# Patient Record
Sex: FEMALE | Race: WHITE | Employment: FULL TIME | URBAN - METROPOLITAN AREA
[De-identification: names, ages, dates, MRNs, and addresses within clinical notes are randomized per-mention and may not be internally consistent; named-entity substitution may affect disease eponyms.]

---

## 2018-10-10 PROBLEM — E66.01 SEVERE OBESITY (HCC): Status: ACTIVE | Noted: 2018-10-10

## 2020-12-29 PROBLEM — E78.5 DYSLIPIDEMIA: Status: ACTIVE | Noted: 2020-12-29

## 2021-04-20 NOTE — H&P (VIEW-ONLY)
Shine Mccormick MD  
Bariatric & Advanced Laparoscopic Surgery & Endoscopy [facility], [address] [place] Phone [phone]   Fax [phone] Date of visit: 2021 Primary/Requesting provider: Meldon Hammans, MD  
   
 
Name: Clarence Gibson MRN: [id number] : 1990 Age: 26 y.o. Sex: female PCP: Meldon Hammans, MD  
 
CC:   
Chief Complaint Patient presents with  New Patient NP Umbilical Hernia HPI: 
 
 Clarence Gibson is a 26 y.o. female who presents for evaluation of abdominal wall bulge that she noticed about 7 years ago but it has slowly growing in size. She reports the bulge has been getting larger over the last year and she does not have a belly button anymore. She denies any drainage or redness of the skin. She denies any large amount of weight. She reports the area is painful. Pain is 5-6/10. Pain is worse with activity and heavy lifting. Pain is better with rest. No nausea or vomiting. No fever or chills. No constipation or diarrhea. Previous abdominal surgeries -  No immunosuppressants. No blood thinners. No smoking. PMH: 
 
History reviewed. No pertinent past medical history. PSH: 
 
Past Surgical History:  
Procedure Laterality Date  HX  SECTION  2013 MEDS: 
 
No current outpatient medications on file. No current facility-administered medications for this visit. ALLERGIES:   
 
Allergies Allergen Reactions  Codeine Itching  Norco [Hydrocodone-Acetaminophen] Shortness of Breath Also makes itchy, clammy, causes a rash, and dizziness.  Tramadol Nausea Only SH: Social History Tobacco Use  Smoking status: Never Smoker  Smokeless tobacco: Never Used Substance Use Topics  Alcohol use: Yes Alcohol/week: 1.0 standard drinks Types: 1 Cans of beer per week Comment: Less than 1 per week  Drug use: No  
 
 
FH: 
 
Family History Problem Relation Age of Onset  Diabetes Mother  Hypertension Mother  Thyroid Disease Mother  Celiac Disease Mother  Hypertension Father  Stroke Father  Hypertension Sister Review of systems: 
Review of Systems Constitutional: Negative for chills, fever and weight loss. HENT: Negative for congestion, hearing loss and sore throat. Eyes: Negative for blurred vision, double vision, discharge and redness. Reading glasses Respiratory: Negative for cough, hemoptysis, shortness of breath, wheezing and stridor. Cardiovascular: Negative for chest pain, palpitations and PND. Gastrointestinal: Positive for abdominal pain. Negative for blood in stool, constipation, diarrhea, heartburn, melena, nausea and vomiting. Genitourinary: Negative for dysuria, frequency, hematuria and urgency. Musculoskeletal: Negative for back pain, falls, joint pain and neck pain. Skin: Negative for itching and rash. Neurological: Negative for dizziness, sensory change, speech change, focal weakness, loss of consciousness and headaches. Endo/Heme/Allergies: Negative for environmental allergies. Does not bruise/bleed easily. Psychiatric/Behavioral: Negative for depression and memory loss. The patient is not nervous/anxious and does not have insomnia. Physical Exam:  
 
Visit Vitals BP (!) 154/105 Pulse 68 Wt 212 lb (96.2 kg) BMI 38.16 kg/m² General:  Well-developed, well-nourished, no distress. Psych:  Cooperative, good insight and judgement. Neuro:  Alert, oriented to person, place and time. HEENT:  Normocephalic, atraumatic. Sclera clear. Lungs:  Unlabored breathing. Symmetrical chest expansion. Chest wall:  No tenderness or deformity. Heart:  Regular rate and rhythm. No JVD. Abdomen:  Soft, non-tender, non-distended. Incarcerated ventral hernia above umbilicus, tender when trying to reduce.  
Extremities:  Extremities normal, atraumatic, no cyanosis or edema. Skin:  Skin color, texture, turgor normal. No rashes. Labs: All recent labs were reviewed. Normal WBC. Normal Hgb. ICD-10-CM ICD-9-CM 1. Incarcerated ventral hernia  K43.6 552.20 2. Periumbilical abdominal pain  R10.33 789.05 Assessment/Plan:  Nestor Pablo is a 26 y.o. female who has signs and symptoms consistent with incarcerated ventral hernia repair 1. I reviewed in detail with the patient what a hernia is, how it forms and the anatomy of the abdomen. I explained the symptoms of a hernia and complications including pain, incarceration, strangulation and obstruction and the symptoms to monitor for at home. 2. I discussed with the patient in detail an open vs. laparoscopic hernia repair with mesh and reviewed the risks and complications of both procedures to include bleeding, infection, mesh infection, recurrence of hernia, injury to abdominal organs and surrounding structures, deep vein thrombosis, pulmonary embolism, need for reoperation, myocardial infarction and death. She would like to proceed with a robotic-assisted laparoscopic incarcerated ventral hernia repair with mesh. She demonstrated understanding of the procedure, risks and complications and I answered all of her questions. Alternative is to watch/wait until symptoms worsen prior to intervention. Nestor Pablo  indicates that she understands the risks and elects to proceed. She has had adequate time to have her questions answered. 3. We specifically discussed the need to take 1-2 weeks off from work followed by light duty for 6 weeks. No heavy lifting, not greater than 20 lbs. for 6 weeks total to enable optimal healing and prevent recurrence. 4. Follow up 2 weeks after surgery.  
 
 
Time: I spent 45 minutes preparing to see patient (including chart review and preparation), obtaining and/or reviewing additional medical history, performing a physical exam and evaluation, documenting clinical information in the electronic health record, independently interpreting results, communicating results to patient, family or caregiver, and/or coordinating care. Signed: Sarath Smaaniego MD 
Bariatric & Minimally Invasive Surgery 4/20/2021 10:31 AM

## 2021-04-28 PROBLEM — E66.09 CLASS 2 OBESITY DUE TO EXCESS CALORIES WITHOUT SERIOUS COMORBIDITY WITH BODY MASS INDEX (BMI) OF 38.0 TO 38.9 IN ADULT: Status: ACTIVE | Noted: 2018-10-10

## 2021-04-28 PROBLEM — K43.9 VENTRAL HERNIA WITHOUT OBSTRUCTION OR GANGRENE: Status: ACTIVE | Noted: 2021-04-28

## 2021-04-29 ENCOUNTER — HOSPITAL ENCOUNTER (OUTPATIENT)
Dept: SURGERY | Age: 31
Discharge: HOME | End: 2021-04-29

## 2021-04-30 VITALS — WEIGHT: 213 LBS | HEIGHT: 63 IN | BODY MASS INDEX: 37.74 KG/M2

## 2021-04-30 NOTE — PERIOP NOTES
Patient verified name and . Order for consent found in EHR and matches case posting; patient verifies procedure. Type 2 surgery, PAT phone assessment complete. Orders received. Labs per surgeon: No lab work ordered. Labs per anesthesia protocol: Hgb; order signed and held in EHR. Patient instructed to come to outpatient lab, [address], any weekday, prior to surgery, between 0800 and 1530 to have lab work completed. Patient verbalized understanding. Patient COVID test completed yesterday; results pending. Patient answered medical/surgical history questions at their best of ability. All prior to admission medications documented in [facility]. Patient instructed to take the following medications the day of surgery according to anesthesia guidelines with a small sip of water: None. Hold all vitamins, supplements, herbals 7 days prior to surgery and NSAIDS/ASA 5 days prior to surgery. Patient instructed on the following:    > Arrive at [facility], time of arrival to be called the day before by 1700  > NPO after midnight including gum, mints, and ice chips  > Responsible adult must drive patient to the hospital, stay during surgery, and patient will need supervision 24 hours after anesthesia  > Use anti-bacterial soap in shower the night before surgery and on the morning of surgery  > All piercings must be removed prior to arrival.    > Leave all valuables (money and jewelry) at home but bring insurance card and ID on DOS.   > Do not wear make-up, nail polish, lotions, cologne, perfumes, powders, or oil on skin.

## 2021-05-04 ENCOUNTER — HOSPITAL ENCOUNTER (OUTPATIENT)
Dept: LAB | Age: 31
Discharge: HOME | End: 2021-05-04
Payer: COMMERCIAL

## 2021-05-04 LAB — HGB BLD-MCNC: 15 G/DL (ref 11.7–15.4)

## 2021-05-04 PROCEDURE — 36415 COLL VENOUS BLD VENIPUNCTURE: CPT

## 2021-05-04 PROCEDURE — 85018 HEMOGLOBIN: CPT

## 2021-05-04 NOTE — PERIOP NOTES
Lab results within anesthesia guidelines.   Recent Results (from the past 12 hour(s))   HEMOGLOBIN    Collection Time: 05/04/21 11:15 AM   Result Value Ref Range    HGB 15.0 11.7 - 15.4 g/dL

## 2021-05-05 ENCOUNTER — ANESTHESIA EVENT (OUTPATIENT)
Dept: SURGERY | Age: 31
End: 2021-05-05
Payer: COMMERCIAL

## 2021-05-06 ENCOUNTER — ANESTHESIA (OUTPATIENT)
Dept: SURGERY | Age: 31
End: 2021-05-06
Payer: COMMERCIAL

## 2021-05-06 ENCOUNTER — HOSPITAL ENCOUNTER (OUTPATIENT)
Age: 31
Setting detail: OUTPATIENT SURGERY
Discharge: HOME | End: 2021-05-06
Attending: SURGERY | Admitting: SURGERY
Payer: COMMERCIAL

## 2021-05-06 VITALS
OXYGEN SATURATION: 97 % | TEMPERATURE: 97.9 F | SYSTOLIC BLOOD PRESSURE: 121 MMHG | RESPIRATION RATE: 24 BRPM | HEART RATE: 78 BPM | BODY MASS INDEX: 37.56 KG/M2 | WEIGHT: 212 LBS | HEIGHT: 63 IN | DIASTOLIC BLOOD PRESSURE: 67 MMHG

## 2021-05-06 DIAGNOSIS — K43.6 INCARCERATED VENTRAL HERNIA: Primary | ICD-10-CM

## 2021-05-06 LAB — HCG UR QL: NEGATIVE

## 2021-05-06 PROCEDURE — 74011000250 HC RX REV CODE- 250

## 2021-05-06 PROCEDURE — 74011250636 HC RX REV CODE- 250/636: Performed by: NURSE ANESTHETIST, CERTIFIED REGISTERED

## 2021-05-06 PROCEDURE — 77030033188 HC TBNG FLTRD BIIFUR DISP CNMD -C: Performed by: SURGERY

## 2021-05-06 PROCEDURE — 77030019908 HC STETH ESOPH SIMS -A: Performed by: ANESTHESIOLOGY

## 2021-05-06 PROCEDURE — 76210000026 HC REC RM PH II 1 TO 1.5 HR: Performed by: SURGERY

## 2021-05-06 PROCEDURE — 49653 PR LAP, VENTRAL HERNIA REPAIR,INCARCERATED: CPT | Performed by: SURGERY

## 2021-05-06 PROCEDURE — 74011250636 HC RX REV CODE- 250/636: Performed by: ANESTHESIOLOGY

## 2021-05-06 PROCEDURE — 74011250636 HC RX REV CODE- 250/636: Performed by: SURGERY

## 2021-05-06 PROCEDURE — 74011250636 HC RX REV CODE- 250/636

## 2021-05-06 PROCEDURE — 77030008606 HC TRCR ENDOSC KII AMR -B: Performed by: SURGERY

## 2021-05-06 PROCEDURE — 77030035029 HC NDL INSUF VERES DISP COVD -B: Performed by: SURGERY

## 2021-05-06 PROCEDURE — 77030037088 HC TUBE ENDOTRACH ORAL NSL COVD-A: Performed by: ANESTHESIOLOGY

## 2021-05-06 PROCEDURE — 77030040361 HC SLV COMPR DVT MDII -B: Performed by: SURGERY

## 2021-05-06 PROCEDURE — 81025 URINE PREGNANCY TEST: CPT

## 2021-05-06 PROCEDURE — 2709999900 HC NON-CHARGEABLE SUPPLY: Performed by: SURGERY

## 2021-05-06 PROCEDURE — 76060000034 HC ANESTHESIA 1.5 TO 2 HR: Performed by: SURGERY

## 2021-05-06 PROCEDURE — 77030003578 HC NDL INSUF VERES AMR -B: Performed by: SURGERY

## 2021-05-06 PROCEDURE — 77030040922 HC BLNKT HYPOTHRM STRY -A: Performed by: ANESTHESIOLOGY

## 2021-05-06 PROCEDURE — C1781 MESH (IMPLANTABLE): HCPCS | Performed by: SURGERY

## 2021-05-06 PROCEDURE — 77030035277 HC OBTRTR BLDELSS DISP INTU -B: Performed by: SURGERY

## 2021-05-06 PROCEDURE — S2900 ROBOTIC SURGICAL SYSTEM: HCPCS | Performed by: SURGERY

## 2021-05-06 PROCEDURE — 77030008518 HC TBNG INSUF ENDO STRY -B: Performed by: SURGERY

## 2021-05-06 PROCEDURE — 77030022704 HC SUT VLOC COVD -B: Performed by: SURGERY

## 2021-05-06 PROCEDURE — 77030016151 HC PROTCTR LNS DFOG COVD -B: Performed by: SURGERY

## 2021-05-06 PROCEDURE — 74011250637 HC RX REV CODE- 250/637: Performed by: ANESTHESIOLOGY

## 2021-05-06 PROCEDURE — 76010000875 HC OR TIME 1.5 TO 2HR INTENSV - TIER 2: Performed by: SURGERY

## 2021-05-06 PROCEDURE — 77030021678 HC GLIDESCP STAT DISP VERT -B: Performed by: ANESTHESIOLOGY

## 2021-05-06 PROCEDURE — 77030008771 HC TU NG SALEM SUMP -A: Performed by: ANESTHESIOLOGY

## 2021-05-06 PROCEDURE — 77030002933 HC SUT MCRYL J&J -A: Performed by: SURGERY

## 2021-05-06 PROCEDURE — 77030031139 HC SUT VCRL2 J&J -A: Performed by: SURGERY

## 2021-05-06 PROCEDURE — 74011000250 HC RX REV CODE- 250: Performed by: SURGERY

## 2021-05-06 PROCEDURE — 76210000000 HC OR PH I REC 2 TO 2.5 HR: Performed by: SURGERY

## 2021-05-06 DEVICE — MESH SURG W6XL6IN POLY POLYLACTIC ACID MFIL KNIT RECTANG: Type: IMPLANTABLE DEVICE | Site: ABDOMEN | Status: FUNCTIONAL

## 2021-05-06 RX ORDER — SODIUM CHLORIDE 0.9 % (FLUSH) 0.9 %
5-40 SYRINGE (ML) INJECTION AS NEEDED
Status: DISCONTINUED | OUTPATIENT
Start: 2021-05-06 | End: 2021-05-06 | Stop reason: HOSPADM

## 2021-05-06 RX ORDER — PROPOFOL 10 MG/ML
INJECTION, EMULSION INTRAVENOUS AS NEEDED
Status: DISCONTINUED | OUTPATIENT
Start: 2021-05-06 | End: 2021-05-06 | Stop reason: HOSPADM

## 2021-05-06 RX ORDER — CYCLOBENZAPRINE HCL 10 MG
10 TABLET ORAL
Qty: 45 TAB | Refills: 0 | Status: SHIPPED | OUTPATIENT
Start: 2021-05-06 | End: 2021-05-21

## 2021-05-06 RX ORDER — MIDAZOLAM HYDROCHLORIDE 1 MG/ML
2 INJECTION, SOLUTION INTRAMUSCULAR; INTRAVENOUS
Status: DISCONTINUED | OUTPATIENT
Start: 2021-05-06 | End: 2021-05-06 | Stop reason: HOSPADM

## 2021-05-06 RX ORDER — TRAMADOL HYDROCHLORIDE 50 MG/1
50 TABLET ORAL
Qty: 28 TAB | Refills: 0 | Status: SHIPPED | OUTPATIENT
Start: 2021-05-06 | End: 2021-05-06 | Stop reason: SDUPTHER

## 2021-05-06 RX ORDER — LIDOCAINE HYDROCHLORIDE 20 MG/ML
INJECTION, SOLUTION EPIDURAL; INFILTRATION; INTRACAUDAL; PERINEURAL AS NEEDED
Status: DISCONTINUED | OUTPATIENT
Start: 2021-05-06 | End: 2021-05-06 | Stop reason: HOSPADM

## 2021-05-06 RX ORDER — SODIUM CHLORIDE, SODIUM LACTATE, POTASSIUM CHLORIDE, CALCIUM CHLORIDE 600; 310; 30; 20 MG/100ML; MG/100ML; MG/100ML; MG/100ML
100 INJECTION, SOLUTION INTRAVENOUS CONTINUOUS
Status: DISCONTINUED | OUTPATIENT
Start: 2021-05-06 | End: 2021-05-06 | Stop reason: HOSPADM

## 2021-05-06 RX ORDER — SODIUM CHLORIDE, SODIUM LACTATE, POTASSIUM CHLORIDE, CALCIUM CHLORIDE 600; 310; 30; 20 MG/100ML; MG/100ML; MG/100ML; MG/100ML
100 INJECTION, SOLUTION INTRAVENOUS CONTINUOUS
Status: DISCONTINUED | OUTPATIENT
Start: 2021-05-06 | End: 2021-05-07 | Stop reason: HOSPADM

## 2021-05-06 RX ORDER — CEFAZOLIN SODIUM/WATER 2 G/20 ML
SYRINGE (ML) INTRAVENOUS AS NEEDED
Status: DISCONTINUED | OUTPATIENT
Start: 2021-05-06 | End: 2021-05-06 | Stop reason: HOSPADM

## 2021-05-06 RX ORDER — ROCURONIUM BROMIDE 10 MG/ML
INJECTION, SOLUTION INTRAVENOUS AS NEEDED
Status: DISCONTINUED | OUTPATIENT
Start: 2021-05-06 | End: 2021-05-06 | Stop reason: HOSPADM

## 2021-05-06 RX ORDER — TRAMADOL HYDROCHLORIDE 50 MG/1
50 TABLET ORAL
Qty: 28 TAB | Refills: 0 | Status: SHIPPED | OUTPATIENT
Start: 2021-05-06 | End: 2021-05-13

## 2021-05-06 RX ORDER — NALOXONE HYDROCHLORIDE 0.4 MG/ML
0.04 INJECTION, SOLUTION INTRAMUSCULAR; INTRAVENOUS; SUBCUTANEOUS
Status: DISCONTINUED | OUTPATIENT
Start: 2021-05-06 | End: 2021-05-07 | Stop reason: HOSPADM

## 2021-05-06 RX ORDER — GLYCOPYRROLATE 0.2 MG/ML
INJECTION INTRAMUSCULAR; INTRAVENOUS AS NEEDED
Status: DISCONTINUED | OUTPATIENT
Start: 2021-05-06 | End: 2021-05-06 | Stop reason: HOSPADM

## 2021-05-06 RX ORDER — FENTANYL CITRATE 50 UG/ML
100 INJECTION, SOLUTION INTRAMUSCULAR; INTRAVENOUS ONCE
Status: DISCONTINUED | OUTPATIENT
Start: 2021-05-06 | End: 2021-05-06 | Stop reason: HOSPADM

## 2021-05-06 RX ORDER — HYDROMORPHONE HYDROCHLORIDE 2 MG/ML
0.5 INJECTION, SOLUTION INTRAMUSCULAR; INTRAVENOUS; SUBCUTANEOUS
Status: DISCONTINUED | OUTPATIENT
Start: 2021-05-06 | End: 2021-05-07 | Stop reason: HOSPADM

## 2021-05-06 RX ORDER — ONDANSETRON 2 MG/ML
INJECTION INTRAMUSCULAR; INTRAVENOUS AS NEEDED
Status: DISCONTINUED | OUTPATIENT
Start: 2021-05-06 | End: 2021-05-06 | Stop reason: HOSPADM

## 2021-05-06 RX ORDER — LIDOCAINE HYDROCHLORIDE 10 MG/ML
0.1 INJECTION INFILTRATION; PERINEURAL AS NEEDED
Status: DISCONTINUED | OUTPATIENT
Start: 2021-05-06 | End: 2021-05-06 | Stop reason: HOSPADM

## 2021-05-06 RX ORDER — DEXAMETHASONE SODIUM PHOSPHATE 4 MG/ML
INJECTION, SOLUTION INTRA-ARTICULAR; INTRALESIONAL; INTRAMUSCULAR; INTRAVENOUS; SOFT TISSUE AS NEEDED
Status: DISCONTINUED | OUTPATIENT
Start: 2021-05-06 | End: 2021-05-06 | Stop reason: HOSPADM

## 2021-05-06 RX ORDER — BUPIVACAINE HYDROCHLORIDE 2.5 MG/ML
INJECTION, SOLUTION EPIDURAL; INFILTRATION; INTRACAUDAL AS NEEDED
Status: DISCONTINUED | OUTPATIENT
Start: 2021-05-06 | End: 2021-05-06 | Stop reason: HOSPADM

## 2021-05-06 RX ORDER — SODIUM CHLORIDE 0.9 % (FLUSH) 0.9 %
5-40 SYRINGE (ML) INJECTION EVERY 8 HOURS
Status: DISCONTINUED | OUTPATIENT
Start: 2021-05-06 | End: 2021-05-06 | Stop reason: HOSPADM

## 2021-05-06 RX ORDER — FENTANYL CITRATE 50 UG/ML
INJECTION, SOLUTION INTRAMUSCULAR; INTRAVENOUS AS NEEDED
Status: DISCONTINUED | OUTPATIENT
Start: 2021-05-06 | End: 2021-05-06 | Stop reason: HOSPADM

## 2021-05-06 RX ORDER — ACETAMINOPHEN 500 MG
1000 TABLET ORAL ONCE
Status: COMPLETED | OUTPATIENT
Start: 2021-05-06 | End: 2021-05-06

## 2021-05-06 RX ORDER — CEFAZOLIN SODIUM/WATER 2 G/20 ML
2 SYRINGE (ML) INTRAVENOUS ONCE
Status: COMPLETED | OUTPATIENT
Start: 2021-05-06 | End: 2021-05-06

## 2021-05-06 RX ORDER — MIDAZOLAM HYDROCHLORIDE 1 MG/ML
2 INJECTION, SOLUTION INTRAMUSCULAR; INTRAVENOUS ONCE
Status: DISCONTINUED | OUTPATIENT
Start: 2021-05-06 | End: 2021-05-06 | Stop reason: HOSPADM

## 2021-05-06 RX ORDER — NEOSTIGMINE METHYLSULFATE 1 MG/ML
INJECTION, SOLUTION INTRAVENOUS AS NEEDED
Status: DISCONTINUED | OUTPATIENT
Start: 2021-05-06 | End: 2021-05-06 | Stop reason: HOSPADM

## 2021-05-06 RX ORDER — KETOROLAC TROMETHAMINE 30 MG/ML
INJECTION, SOLUTION INTRAMUSCULAR; INTRAVENOUS AS NEEDED
Status: DISCONTINUED | OUTPATIENT
Start: 2021-05-06 | End: 2021-05-06 | Stop reason: HOSPADM

## 2021-05-06 RX ADMIN — SODIUM CHLORIDE, SODIUM LACTATE, POTASSIUM CHLORIDE, AND CALCIUM CHLORIDE: 600; 310; 30; 20 INJECTION, SOLUTION INTRAVENOUS at 12:12

## 2021-05-06 RX ADMIN — SODIUM CHLORIDE, SODIUM LACTATE, POTASSIUM CHLORIDE, AND CALCIUM CHLORIDE 100 ML/HR: 600; 310; 30; 20 INJECTION, SOLUTION INTRAVENOUS at 09:38

## 2021-05-06 RX ADMIN — CEFAZOLIN 2 G: 1 INJECTION, POWDER, FOR SOLUTION INTRAVENOUS at 11:33

## 2021-05-06 RX ADMIN — ONDANSETRON 4 MG: 2 INJECTION INTRAMUSCULAR; INTRAVENOUS at 12:58

## 2021-05-06 RX ADMIN — FENTANYL CITRATE 100 MCG: 50 INJECTION INTRAMUSCULAR; INTRAVENOUS at 11:38

## 2021-05-06 RX ADMIN — GLYCOPYRROLATE 0.4 MG: 0.2 INJECTION, SOLUTION INTRAMUSCULAR; INTRAVENOUS at 13:05

## 2021-05-06 RX ADMIN — HYDROMORPHONE HYDROCHLORIDE 0.5 MG: 2 INJECTION INTRAMUSCULAR; INTRAVENOUS; SUBCUTANEOUS at 14:36

## 2021-05-06 RX ADMIN — Medication 3 MG: at 13:05

## 2021-05-06 RX ADMIN — CEFAZOLIN SODIUM 2 G: 10 INJECTION, POWDER, FOR SOLUTION INTRAVENOUS at 09:38

## 2021-05-06 RX ADMIN — MIDAZOLAM 2 MG: 1 INJECTION INTRAMUSCULAR; INTRAVENOUS at 11:25

## 2021-05-06 RX ADMIN — ACETAMINOPHEN 1000 MG: 500 TABLET, FILM COATED ORAL at 09:46

## 2021-05-06 RX ADMIN — LIDOCAINE HYDROCHLORIDE 60 MG: 20 INJECTION, SOLUTION EPIDURAL; INFILTRATION; INTRACAUDAL; PERINEURAL at 11:38

## 2021-05-06 RX ADMIN — FENTANYL CITRATE 100 MCG: 50 INJECTION INTRAMUSCULAR; INTRAVENOUS at 12:02

## 2021-05-06 RX ADMIN — ROCURONIUM BROMIDE 50 MG: 10 INJECTION, SOLUTION INTRAVENOUS at 11:38

## 2021-05-06 RX ADMIN — PROPOFOL 200 MG: 10 INJECTION, EMULSION INTRAVENOUS at 11:38

## 2021-05-06 RX ADMIN — DEXAMETHASONE SODIUM PHOSPHATE 5 MG: 4 INJECTION, SOLUTION INTRAMUSCULAR; INTRAVENOUS at 11:58

## 2021-05-06 RX ADMIN — KETOROLAC TROMETHAMINE 30 MG: 30 INJECTION, SOLUTION INTRAMUSCULAR; INTRAVENOUS at 13:01

## 2021-05-06 NOTE — INTERVAL H&P NOTE
Update History & Physical 
 
The Patient's History and Physical of April 20, Robotic assisted lap incarcerated VHR with mesh was reviewed with the patient and I examined the patient. There was no change. The surgical site was confirmed by the patient and me. Plan:  The risk, benefits, expected outcome, and alternative to the recommended procedure have been discussed with the patient. Patient understands and wants to proceed with the procedure.  
 
Electronically signed by Greg Reardon MD on 5/6/2021 at 11:29 AM

## 2021-05-06 NOTE — OP NOTES
Operative Report    Name: Jarrell Randall      MRN: [id number]       : 1990       Age: 26 y.o. Sex: female    Date of Surgery: 2021     Preoperative Diagnosis: Ventral hernia without obstruction or gangrene [K43.9]     Postoperative Diagnosis: Ventral hernia without obstruction or gangrene [K43.9]     Name of procedure: Procedure(s): HERNIA VENTRAL REPAIR ROBOTIC ASSISTED WITH MESH     Surgeon: Trang Armendariz MD     Anesthesia: General     Complications: None    Estimated Blood Loss: Minimal           Specimens: * No specimens in log *    Implants:  Implant Name Type Inv. Item Serial No.  Lot No. LRB No. Used Action   MESH SURG [id number] POLY POLYLACTIC ACID MFIL KNIT RECTANG - [id number]  [address] [facility] POLYLACTIC ACID MFIL KNIT 230 [facility] SURGICAL_WD [id number] N/A 1 Implanted       Findings: 3 x 3 cm abdominal wall hernia defect found and closed with a running 0 Stratiffix suture. A 10 x 10 cm Progrip mesh was placed in the preperitoneal space and fixated. The peritoneum was then closed and the mesh was completely covered. Statement of Medical Necessity: Jarrell Randall is a 26 y.o. female who presented to the clinic complaining of periumbilical bulge and abdominal pain which has been worsening with time. On exam, he was found to have a incarcerated ventral hernia. We discussed risks, benefits of laparoscopic and open surgery as well as alternatives of surgery. Patient understood and agreed to proceed with a robotic-assisted laparoscopic ventral hernia repair with mesh. We discussed risks of hernia repair including but not limited to pain, infection, bleeding, scar, conversion from laparoscopic to open, injury to organs, enterotomy, cautery injury, hernia recurrence, need for additional procedures. Procedure Details   After informed consent was taken, patient was taken to the operating room and placed in supine position.  Anesthesia was induced and patient was intubated. Patient received preoperative antibiotics. The abdomen was prepped and draped in standard sterile fashion. A timeout was performed with all team members present. A 1 cm incision was made in the left upper quadrant. A Veress needle was inserted. Saline drop test was normal. The abdomen was insufflated with carbon dioxide to a pressure of 15 mmHg and the patient tolerated insufflation well. The abdomen was bluntly accessed with a blunt 8 mm robotic trocar. A laparoscope was inserted and a general survey was performed. There was no evidence of intraabdominal injury from trocar placement. Two additional 8 mm robotic cannulas were inserted in the left side of the abdomen. The patient was placed in a right tilted position. The robot was docked. The instruments were carefully inserted under direct visualization. A general survey was performed and a incarcerated ventral hernia was observed. Using a combination of blunt and sharp dissection, a prepreritoneal plane was created at the location of the hernia to reduce the hernia contents and accommodate the mesh. A 3 x 3 cm abdominal hernia defect was identified and measured. The contents of the hernia were carefully reduced intraperitoneally. The hernia defect was closed with 3-0 PDS in a running fashion. A Progrip mesh was used and cut to 10 x 10 cm. We then implanted the mesh into the preperitoneal space created. We had wide overlap of the hernia defect and a very good repair. No bleeding was observed. The insufflation pressure was reduced to 8 mmHg. The preperitoneal plane was closed with 2-0 V-loc suture in a running fashion. The robot was undocked. The abdomen was desufflated. The ports were removed. The skin of all cannula insertion sites was closed with 4-0 Monocryl subcuticular sutures. Mastisol and Steri strips were applied to the skin incisions.  A pressure dressing was placed at the umbilicus to avoid seroma from dead space. All counts were reported as correct. The patient was awakened from anesthesia, transferred to a stretcher, and transported to the PACU in good condition.         Signed by: Thong Mcdonnell MD  [facility] - Bariatric & Minimally Invasive Surgery  5/6/2021 1:24 PM

## 2021-05-06 NOTE — ANESTHESIA PREPROCEDURE EVALUATION
Relevant Problems   No relevant active problems       Anesthetic History   No history of anesthetic complications            Review of Systems / Medical History  Pertinent labs reviewed    Pulmonary  Within defined limits                 Neuro/Psych   Within defined limits           Cardiovascular  Within defined limits                Exercise tolerance: >4 METS     GI/Hepatic/Renal  Within defined limits              Endo/Other        Obesity     Other Findings              Physical Exam    Airway  Mallampati: III  TM Distance: < 4 cm  Neck ROM: normal range of motion, short neck   Mouth opening: Normal     Cardiovascular  Regular rate and rhythm,  S1 and S2 normal,  no murmur, click, rub, or gallop             Dental  No notable dental hx       Pulmonary  Breath sounds clear to auscultation               Abdominal  GI exam deferred       Other Findings            Anesthetic Plan    ASA: 2  Anesthesia type: general          Induction: Intravenous  Anesthetic plan and risks discussed with: Patient      Glidescope in room.

## 2021-05-06 NOTE — DISCHARGE INSTRUCTIONS
No bathtub or pool for 2 weeks. Ok to shower. No weight lifting greater than 20 lbs for 4 weeks. Remove the clear dressing and gauze dressing from your umbilicus in 5 days. Leave the Steri strips in place. They will fall off on their own in 7-10 days. Take tylenol or ibuprofen for as needed for mild pain every 4-6 hours. Tramadol prescribed for mod to severe pain. This is a narcotic and can cause drowsiness, nausea and vomiting and constipation. Take Colace 100mg BID (over the counter) if constipated.

## 2021-05-06 NOTE — ANESTHESIA POSTPROCEDURE EVALUATION
Procedure(s): HERNIA VENTRAL REPAIR ROBOTIC ASSISTED WITH MESH.     general    Anesthesia Post Evaluation      Multimodal analgesia: multimodal analgesia not used between 6 hours prior to anesthesia start to PACU discharge  Patient location during evaluation: PACU  Patient participation: complete - patient participated  Level of consciousness: awake and alert  Pain management: adequate  Airway patency: patent  Anesthetic complications: no  Cardiovascular status: hemodynamically stable  Respiratory status: acceptable  Hydration status: acceptable        INITIAL Post-op Vital signs:   Vitals Value Taken Time   /67 05/06/21 1436   Temp 36.6 °C (97.9 °F) 05/06/21 1320   Pulse 78 05/06/21 1436   Resp 24 05/06/21 1436   SpO2 97 % 05/06/21 1436

## 2021-08-16 PROBLEM — G44.219 EPISODIC TENSION-TYPE HEADACHE, NOT INTRACTABLE: Status: ACTIVE | Noted: 2021-08-16

## 2021-09-20 PROBLEM — K43.9 VENTRAL HERNIA WITHOUT OBSTRUCTION OR GANGRENE: Status: RESOLVED | Noted: 2021-04-28 | Resolved: 2021-09-20

## 2021-09-24 ENCOUNTER — HOSPITAL ENCOUNTER (EMERGENCY)
Age: 31
Discharge: LEFT BEFORE DISCHARGE | End: 2021-09-24
Payer: COMMERCIAL

## 2021-09-24 PROCEDURE — 75810000275 HC EMERGENCY DEPT VISIT NO LEVEL OF CARE

## 2022-01-11 ENCOUNTER — HOSPITAL ENCOUNTER (OUTPATIENT)
Dept: GENERAL RADIOLOGY | Age: 32
Discharge: HOME | End: 2022-01-11
Payer: COMMERCIAL

## 2022-01-11 DIAGNOSIS — M79.672 BILATERAL FOOT PAIN: ICD-10-CM

## 2022-01-11 DIAGNOSIS — M79.671 BILATERAL FOOT PAIN: ICD-10-CM

## 2022-01-11 PROCEDURE — 73630 X-RAY EXAM OF FOOT: CPT

## 2022-03-18 PROBLEM — G44.219 EPISODIC TENSION-TYPE HEADACHE, NOT INTRACTABLE: Status: ACTIVE | Noted: 2021-08-16

## 2022-03-19 PROBLEM — E66.09 CLASS 2 OBESITY DUE TO EXCESS CALORIES WITHOUT SERIOUS COMORBIDITY WITH BODY MASS INDEX (BMI) OF 38.0 TO 38.9 IN ADULT: Status: ACTIVE | Noted: 2018-10-10

## 2022-03-19 PROBLEM — E78.5 DYSLIPIDEMIA: Status: ACTIVE | Noted: 2020-12-29

## 2022-03-19 PROBLEM — E66.812 CLASS 2 OBESITY DUE TO EXCESS CALORIES WITHOUT SERIOUS COMORBIDITY WITH BODY MASS INDEX (BMI) OF 38.0 TO 38.9 IN ADULT: Status: ACTIVE | Noted: 2018-10-10

## 2022-07-11 ENCOUNTER — OFFICE VISIT (OUTPATIENT)
Dept: FAMILY MEDICINE CLINIC | Facility: CLINIC | Age: 32
End: 2022-07-11
Payer: COMMERCIAL

## 2022-07-11 VITALS
BODY MASS INDEX: 38.27 KG/M2 | HEIGHT: 63 IN | TEMPERATURE: 98.7 F | OXYGEN SATURATION: 97 % | SYSTOLIC BLOOD PRESSURE: 112 MMHG | WEIGHT: 216 LBS | DIASTOLIC BLOOD PRESSURE: 78 MMHG | HEART RATE: 81 BPM

## 2022-07-11 DIAGNOSIS — Z11.59 NEED FOR HEPATITIS C SCREENING TEST: ICD-10-CM

## 2022-07-11 DIAGNOSIS — G44.219 EPISODIC TENSION-TYPE HEADACHE, NOT INTRACTABLE: ICD-10-CM

## 2022-07-11 DIAGNOSIS — K21.9 GASTROESOPHAGEAL REFLUX DISEASE WITHOUT ESOPHAGITIS: ICD-10-CM

## 2022-07-11 DIAGNOSIS — E66.09 CLASS 2 OBESITY DUE TO EXCESS CALORIES WITHOUT SERIOUS COMORBIDITY WITH BODY MASS INDEX (BMI) OF 38.0 TO 38.9 IN ADULT: ICD-10-CM

## 2022-07-11 DIAGNOSIS — E78.5 DYSLIPIDEMIA: ICD-10-CM

## 2022-07-11 DIAGNOSIS — Z00.00 PREVENTATIVE HEALTH CARE: Primary | ICD-10-CM

## 2022-07-11 DIAGNOSIS — Z11.4 SCREENING FOR HIV WITHOUT PRESENCE OF RISK FACTORS: ICD-10-CM

## 2022-07-11 PROCEDURE — 99395 PREV VISIT EST AGE 18-39: CPT | Performed by: NURSE PRACTITIONER

## 2022-07-11 RX ORDER — OMEPRAZOLE 20 MG/1
20 CAPSULE, DELAYED RELEASE ORAL DAILY
Qty: 90 CAPSULE | Refills: 3 | Status: SHIPPED | OUTPATIENT
Start: 2022-07-11

## 2022-07-11 RX ORDER — BIOTIN 10 MG
TABLET ORAL DAILY
COMMUNITY

## 2022-07-11 ASSESSMENT — PATIENT HEALTH QUESTIONNAIRE - PHQ9
SUM OF ALL RESPONSES TO PHQ QUESTIONS 1-9: 0
2. FEELING DOWN, DEPRESSED OR HOPELESS: 0
SUM OF ALL RESPONSES TO PHQ QUESTIONS 1-9: 0
SUM OF ALL RESPONSES TO PHQ9 QUESTIONS 1 & 2: 0
1. LITTLE INTEREST OR PLEASURE IN DOING THINGS: 0
SUM OF ALL RESPONSES TO PHQ QUESTIONS 1-9: 0
SUM OF ALL RESPONSES TO PHQ QUESTIONS 1-9: 0

## 2022-07-11 ASSESSMENT — ENCOUNTER SYMPTOMS
VOMITING: 0
WHEEZING: 0
DIARRHEA: 0
ABDOMINAL PAIN: 0
NAUSEA: 0
CONSTIPATION: 0
SORE THROAT: 0
EYE PAIN: 0
COUGH: 0
BACK PAIN: 0
SHORTNESS OF BREATH: 0
SINUS PAIN: 0

## 2022-07-11 NOTE — PATIENT INSTRUCTIONS
Patient Education        Plantar Fasciitis: Exercises  Introduction  Here are some examples of exercises for you to try. The exercises may be suggested for a condition or for rehabilitation. Start each exercise slowly. Ease off the exercises if you start to have pain. You will be told when to start these exercises and which ones will work bestfor you. How to do the exercises  Towel stretch    1. Sit with your legs extended and knees straight. 2. Place a towel around your foot just under the toes. 3. Hold each end of the towel in each hand, with your hands above your knees. 4. Pull back with the towel so that your foot stretches toward you. 5. Hold the position for at least 15 to 30 seconds. 6. Repeat 2 to 4 times a session, up to 5 sessions a day. Calf stretch    This exercise stretches the muscles at the back of the lower leg (the calf) andthe Achilles tendon. Do this exercise 3 or 4 times a day, 5 days a week. 1. Stand facing a wall with your hands on the wall at about eye level. Put the leg you want to stretch about a step behind your other leg. 2. Keeping your back heel on the floor, bend your front knee until you feel a stretch in the back leg. 3. Hold the stretch for 15 to 30 seconds. Repeat 2 to 4 times. Plantar fascia and calf stretch    Stretching the plantar fascia and calf muscles can increase flexibility and decrease heel pain. You can do this exercise several times each day and beforeand after activity. 1. Stand on a step as shown above. Be sure to hold on to the banister. 2. Slowly let your heels down over the edge of the step as you relax your calf muscles. You should feel a gentle stretch across the bottom of your foot and up the back of your leg to your knee. 3. Hold the stretch about 15 to 30 seconds, and then tighten your calf muscle a little to bring your heel back up to the level of the step. Repeat 2 to 4 times.   Towel curls    Make this exercise more challenging by placing a weighted object, such as asoup can, on the other end of the towel. 1. While sitting, place your foot on a towel on the floor and scrunch the towel toward you with your toes. 2. Then, also using your toes, push the towel away from you. [place] pickups    1. Put marbles on the floor next to a cup.  2. Using your toes, try to lift the marbles up from the floor and put them in the cup. Follow-up care is a key part of your treatment and safety. Be sure to make and go to all appointments, and call your doctor if you are having problems. It's also a good idea to know your test results and keep alist of the medicines you take. Where can you learn more? Go to https://Aloompa.[employer] org and sign in to your [employer] account. Enter O711 in the [employer] box to learn more about \"Plantar Fasciitis: Exercises. \"     If you do not have an account, please click on the \"Sign Up Now\" link. Current as of: March 9, 2022               Content Version: 13.3  © 2006-2022 [employer]. Care instructions adapted under license by [facility] ([facility]). If you have questions about a medical condition or this instruction, always ask your healthcare professional. [address] any warranty or liability for your use of this information. Patient Education        Plantar Fasciitis: Care Instructions  Overview     Plantar fasciitis is pain and inflammation of the plantar fascia, the tissue at the bottom of your foot that connects the heel bone to the toes. The plantar fascia also supports the arch. If you strain the plantar fascia, it can developsmall tears and cause heel pain when you stand or walk. Plantar fasciitis can be caused by running or other sports. It also may occur in people who are overweight or who have high arches or flat feet. You may get plantar fasciitis if you walk or stand for long periods, or have a tightAchilles tendon or calf muscles.   You can improve your foot pain with rest and other care at home. It might takea few weeks to a few months for your foot to heal completely. Follow-up care is a key part of your treatment and safety. Be sure to make and go to all appointments, and call your doctor if you are having problems. It's also a good idea to know your test results and keep alist of the medicines you take. How can you care for yourself at home?  Rest your feet often. Reduce your activity to a level that lets you avoid pain. If possible, do not run or walk on hard surfaces.  Take pain medicines exactly as directed. ? If the doctor gave you a prescription medicine for pain, take it as prescribed. ? If you are not taking a prescription pain medicine, take an over-the-counter anti-inflammatory medicine for pain and swelling, such as ibuprofen (Advil, Motrin) or naproxen (Aleve). Read and follow all instructions on the label.  Use ice massage to help with pain and swelling. You can use an ice cube or an ice cup several times a day. To make an ice cup, fill a paper cup with water and freeze it. Cut off the top of the cup until a half-inch of ice shows. Hold onto the remaining paper to use the cup. Rub the ice in small circles over the area for 5 to 7 minutes.  Contrast baths, which alternate hot and cold water, can also help reduce swelling. But because heat alone may make pain and swelling worse, end a contrast bath with a soak in cold water.  Wear a night splint if your doctor suggests it. A night splint holds your foot with the toes pointed up and the foot and ankle at a 90-degree angle. This position gives the bottom of your foot a constant, gentle stretch.  Do simple exercises such as calf stretches and towel stretches 2 to 3 times each day, especially when you first get up in the morning. These can help the plantar fascia become more flexible. They also make the muscles that support your arch stronger.  Hold these stretches for 15 to 30 seconds per stretch. Repeat 2 to 4 times. ? Stand about 1 foot from a wall. Place the palms of both hands against the wall at chest level. Lean forward against the wall, keeping one leg with the knee straight and heel on the ground while bending the knee of the other leg.  ? Sit down on the floor or a mat with your feet stretched in front of you. Roll up a towel lengthwise, and loop it over the ball of your foot. Holding the towel at both ends, gently pull the towel toward you to stretch your foot.  Wear shoes with good arch support. Athletic shoes or shoes with a well-cushioned sole are good choices.  Replace athletic shoes regularly.  Try heel cups or shoe inserts (orthotics) to help cushion your heel. You can buy these at many shoe stores.  Put on your shoes as soon as you get out of bed. Going barefoot or wearing slippers may make your pain worse.  Reach and stay at a good weight for your height. This puts less strain on your feet. When should you call for help? Call your doctor now or seek immediate medical care if:     You have heel pain with fever, redness, or warmth in your heel.      You cannot put weight on the sore foot. Watch closely for changes in your health, and be sure to contact your doctor if:     You have numbness or tingling in your heel.      Your heel pain lasts more than 2 weeks. Where can you learn more? Go to https://JobpartnersisaíasThotz.[employer]. org and sign in to your [employer] account. Enter P400 in the [facility] box to learn more about \"Plantar Fasciitis: Care Instructions. \"     If you do not have an account, please click on the \"Sign Up Now\" link. Current as of: March 9, 2022               Content Version: 13.3  © 3330-2584 [employer] Inc[employer]. Care instructions adapted under license by [facility] ([facility]).  If you have questions about a medical condition or this instruction, always ask your healthcare professional. Ch[employer]jia disclaims any warranty or liability for your use of this information.

## 2022-07-11 NOTE — PROGRESS NOTES
2022    Yisel Neff (:  1990) is a 33 y.o. female, here for a preventive medicine evaluation. Patient Active Problem List   Diagnosis    Episodic tension-type headache, not intractable    Dyslipidemia    Class 2 obesity due to excess calories without serious comorbidity with body mass index (BMI) of 38.0 to 38.9 in adult    Gastroesophageal reflux disease without esophagitis       Review of Systems   Constitutional: Negative for appetite change, fatigue, fever and unexpected weight change. HENT: Negative for congestion, ear pain, postnasal drip, sinus pain and sore throat. Eyes: Negative for pain. Respiratory: Negative for cough, shortness of breath and wheezing. Cardiovascular: Negative for palpitations and leg swelling. Gastrointestinal: Negative for abdominal pain, constipation, diarrhea, nausea and vomiting. Genitourinary: Negative for dysuria, frequency and urgency. Musculoskeletal: Negative for back pain and joint swelling. Skin: Negative for rash and wound. Neurological: Negative for dizziness, weakness and headaches. Hematological: Does not bruise/bleed easily. Prior to Visit Medications    Medication Sig Taking? Authorizing Provider   Multiple Vitamins-Minerals (MULTIVITAMIN ADULT) CHEW Take by mouth daily Yes Historical Provider, MD   ECHINACEA PO Take by mouth 760 mg    Take once daily for 3 weeks. Yes Historical Provider, MD   omeprazole (PRILOSEC) 20 MG delayed release capsule Take 1 capsule by mouth daily Yes LAURIE Munoz CNP   propranolol (INDERAL XL) 80 MG extended release capsule Take 1 capsule by mouth daily Yes LAURIE Munoz CNP   magnesium oxide (MAG-OX) 400 MG tablet Take 400 mg by mouth daily Yes Ar Automatic Reconciliation        Allergies   Allergen Reactions    Hydrocodone-Acetaminophen Shortness Of Breath     Also makes itchy, clammy, causes a rash, and dizziness.     Codeine Itching    Tramadol Nausea Year: Not on file    Number of Places Lived in the Last Year: Not on file    Unstable Housing in the Last Year: Not on file        Family History   Problem Relation Age of Onset    Diabetes Mother     Hypertension Mother     Thyroid Disease Mother     Hypertension Sister     Hypertension Father     Stroke Father     Celiac Disease Mother        ADVANCE DIRECTIVE: N, <no information>    Vitals:    07/11/22 1310   BP: 112/78   Site: Left Upper Arm   Position: Sitting   Cuff Size: Large Adult   Pulse: 81   Temp: 98.7 °F (37.1 °C)   TempSrc: Oral   SpO2: 97%   Weight: 216 lb (98 kg)   Height: 5' 3\" (1.6 m)     Estimated body mass index is 38.26 kg/m² as calculated from the following:    Height as of this encounter: 5' 3\" (1.6 m). Weight as of this encounter: 216 lb (98 kg). Physical Exam  Vitals reviewed. Constitutional:       Appearance: Normal appearance. HENT:      Head: Normocephalic and atraumatic. Eyes:      Extraocular Movements: Extraocular movements intact. Pupils: Pupils are equal, round, and reactive to light. Cardiovascular:      Rate and Rhythm: Normal rate and regular rhythm. Heart sounds: Normal heart sounds. Pulmonary:      Effort: Pulmonary effort is normal.      Breath sounds: Normal breath sounds. Abdominal:      General: Abdomen is flat. Skin:     General: Skin is warm and dry. Neurological:      General: No focal deficit present. Mental Status: She is alert and oriented to person, place, and time. No flowsheet data found. Lab Results   Component Value Date/Time    CHOL 203 12/28/2020 10:59 AM    TRIG 116 12/28/2020 10:59 AM    HDL 40 12/28/2020 10:59 AM    LDLCALC 142 12/28/2020 10:59 AM    GLUCOSE 96 12/28/2020 10:59 AM       The ASCVD Risk score (Radha Collado et al., 2013) failed to calculate for the following reasons:     The 2013 ASCVD risk score is only valid for ages 36 to 78    Immunization History   Administered Date(s) Administered   [facility] Td (Adult), 2 Lf Tetanus Toxoid, Pf (Td, Absorbed) 08/01/2021       Health Maintenance   Topic Date Due    Varicella vaccine (1 of 2 - 2-dose childhood series) Never done    COVID-19 Vaccine (1) Never done    HIV screen  Never done    Hepatitis C screen  Never done    DTaP/Tdap/Td vaccine (1 - Tdap) 08/01/2031 (Originally 8/2/2021)    Flu vaccine (1) 09/01/2022    Depression Screen  01/11/2023    Cervical cancer screen  04/22/2024    Hepatitis A vaccine  Aged Out    Hepatitis B vaccine  Aged Out    Hib vaccine  Aged Out    Meningococcal (ACWY) vaccine  Aged Out    Pneumococcal 0-64 years Vaccine  Aged Out       Assessment & Plan   Preventative health care  -     CBC with Auto Differential; Future  -     Comprehensive Metabolic Panel; Future  -     Lipid Panel; Future  Episodic tension-type headache, not intractable  Assessment & Plan:   Well-controlled, continue current medications  Orders:  -     propranolol (INDERAL XL) 80 MG extended release capsule; Take 1 capsule by mouth daily, Disp-90 capsule, R-3Normal  Gastroesophageal reflux disease without esophagitis  Assessment & Plan:   Well-controlled, continue current medications  Orders:  -     omeprazole (PRILOSEC) 20 MG delayed release capsule; Take 1 capsule by mouth daily, Disp-90 capsule, R-3Normal  Class 2 obesity due to excess calories without serious comorbidity with body mass index (BMI) of 38.0 to 38.9 in adult  Assessment & Plan:   Uncontrolled, lifestyle modifications recommended. Dyslipidemia  Assessment & Plan:   Well-controlled, continue current treatment plan and assess labs today  Screening for HIV without presence of risk factors  -     HIV 1/2 Ag/Ab, 4TH Generation,W Rflx Confirm; Future  Need for hepatitis C screening test  -     Hepatitis C Antibody;  Future    Return in about 1 year (around 7/11/2023) for CPE.         --Bertha Fletcher, APRN - CNP

## 2022-07-12 LAB
ALBUMIN SERPL-MCNC: 3.8 G/DL (ref 3.5–5)
ALBUMIN/GLOB SERPL: 1.1 {RATIO} (ref 1.2–3.5)
ALP SERPL-CCNC: 90 U/L (ref 50–136)
ALT SERPL-CCNC: 24 U/L (ref 12–65)
ANION GAP SERPL CALC-SCNC: 7 MMOL/L (ref 7–16)
AST SERPL-CCNC: 14 U/L (ref 15–37)
BASOPHILS # BLD: 0.1 K/UL (ref 0–0.2)
BASOPHILS NFR BLD: 1 % (ref 0–2)
BILIRUB SERPL-MCNC: 0.4 MG/DL (ref 0.2–1.1)
BUN SERPL-MCNC: 13 MG/DL (ref 6–23)
CALCIUM SERPL-MCNC: 9.3 MG/DL (ref 8.3–10.4)
CHLORIDE SERPL-SCNC: 110 MMOL/L (ref 98–107)
CHOLEST SERPL-MCNC: 214 MG/DL
CO2 SERPL-SCNC: 22 MMOL/L (ref 21–32)
CREAT SERPL-MCNC: 1 MG/DL (ref 0.6–1)
DIFFERENTIAL METHOD BLD: ABNORMAL
EOSINOPHIL # BLD: 0.2 K/UL (ref 0–0.8)
EOSINOPHIL NFR BLD: 3 % (ref 0.5–7.8)
ERYTHROCYTE [DISTWIDTH] IN BLOOD BY AUTOMATED COUNT: 13.5 % (ref 11.9–14.6)
GLOBULIN SER CALC-MCNC: 3.4 G/DL (ref 2.3–3.5)
GLUCOSE SERPL-MCNC: 112 MG/DL (ref 65–100)
HCT VFR BLD AUTO: 41.3 % (ref 35.8–46.3)
HCV AB SER QL: NONREACTIVE
HDLC SERPL-MCNC: 34 MG/DL (ref 40–60)
HDLC SERPL: 6.3 {RATIO}
HGB BLD-MCNC: 13.8 G/DL (ref 11.7–15.4)
HIV 1+2 AB+HIV1 P24 AG SERPL QL IA: NONREACTIVE
HIV 1/2 RESULT COMMENT: NORMAL
IMM GRANULOCYTES # BLD AUTO: 0 K/UL (ref 0–0.5)
IMM GRANULOCYTES NFR BLD AUTO: 0 % (ref 0–5)
LDLC SERPL CALC-MCNC: 142.2 MG/DL
LYMPHOCYTES # BLD: 2 K/UL (ref 0.5–4.6)
LYMPHOCYTES NFR BLD: 27 % (ref 13–44)
MCH RBC QN AUTO: 29.7 PG (ref 26.1–32.9)
MCHC RBC AUTO-ENTMCNC: 33.4 G/DL (ref 31.4–35)
MCV RBC AUTO: 89 FL (ref 79.6–97.8)
MONOCYTES # BLD: 0.5 K/UL (ref 0.1–1.3)
MONOCYTES NFR BLD: 7 % (ref 4–12)
NEUTS SEG # BLD: 4.5 K/UL (ref 1.7–8.2)
NEUTS SEG NFR BLD: 62 % (ref 43–78)
NRBC # BLD: 0 K/UL (ref 0–0.2)
PLATELET # BLD AUTO: 477 K/UL (ref 150–450)
PMV BLD AUTO: 9.4 FL (ref 9.4–12.3)
POTASSIUM SERPL-SCNC: 4.1 MMOL/L (ref 3.5–5.1)
PROT SERPL-MCNC: 7.2 G/DL (ref 6.3–8.2)
RBC # BLD AUTO: 4.64 M/UL (ref 4.05–5.2)
SODIUM SERPL-SCNC: 139 MMOL/L (ref 136–145)
TRIGL SERPL-MCNC: 189 MG/DL (ref 35–150)
VLDLC SERPL CALC-MCNC: 37.8 MG/DL (ref 6–23)
WBC # BLD AUTO: 7.3 K/UL (ref 4.3–11.1)

## 2022-10-20 ENCOUNTER — OFFICE VISIT (OUTPATIENT)
Dept: OCCUPATIONAL MEDICINE | Age: 32
End: 2022-10-20

## 2022-10-20 DIAGNOSIS — M54.2 NECK PAIN: Primary | ICD-10-CM

## 2022-10-20 RX ORDER — IBUPROFEN 800 MG/1
800 TABLET ORAL 2 TIMES DAILY PRN
Qty: 30 TABLET | Refills: 0 | Status: SHIPPED | OUTPATIENT
Start: 2022-10-20

## 2022-10-27 ASSESSMENT — ENCOUNTER SYMPTOMS
ABDOMINAL PAIN: 0
SHORTNESS OF BREATH: 0

## 2022-10-27 NOTE — PROGRESS NOTES
 HERNIA REPAIR  05/2021    TUBAL LIGATION N/A 2013     Family History   Problem Relation Age of Onset    Diabetes Mother     Hypertension Mother     Thyroid Disease Mother     Hypertension Sister     Hypertension Father     Stroke Father     Celiac Disease Mother      Social History     Tobacco Use    Smoking status: Never    Smokeless tobacco: Never   Substance Use Topics    Alcohol use: Yes     Alcohol/week: 1.0 standard drink       Review of Systems   Eyes:  Negative for visual disturbance. Respiratory:  Negative for shortness of breath. Cardiovascular:  Negative for chest pain, palpitations and leg swelling. Gastrointestinal:  Negative for abdominal pain. Musculoskeletal:  Positive for neck pain and neck stiffness. Neurological:  Negative for dizziness, syncope and light-headedness. OBJECTIVE:  There were no vitals taken for this visit. Physical Exam  Constitutional:       Appearance: Normal appearance. Cardiovascular:      Rate and Rhythm: Normal rate and regular rhythm. Pulmonary:      Effort: Pulmonary effort is normal.      Breath sounds: Normal breath sounds. Musculoskeletal:      Cervical back: Pain with movement, spinous process tenderness and muscular tenderness present. Neurological:      Mental Status: She is alert. ASSESSMENT and PLAN    Vilma Munson was seen today for neck pain. Diagnoses and all orders for this visit:    Neck pain    Other orders  -     ibuprofen (ADVIL;MOTRIN) 800 MG tablet; Take 1 tablet by mouth 2 times daily as needed for Pain      Rest, ice, given exercises, take ibuprofen as needed for pain. No improvenet or worsening symptoms RTC.

## 2023-10-28 DIAGNOSIS — K21.9 GASTROESOPHAGEAL REFLUX DISEASE WITHOUT ESOPHAGITIS: ICD-10-CM

## 2023-10-28 DIAGNOSIS — G44.219 EPISODIC TENSION-TYPE HEADACHE, NOT INTRACTABLE: ICD-10-CM

## 2023-10-30 RX ORDER — OMEPRAZOLE 20 MG/1
20 CAPSULE, DELAYED RELEASE ORAL DAILY
Qty: 90 CAPSULE | Refills: 0 | Status: SHIPPED | OUTPATIENT
Start: 2023-10-30

## 2023-10-30 SDOH — ECONOMIC STABILITY: TRANSPORTATION INSECURITY
IN THE PAST 12 MONTHS, HAS LACK OF TRANSPORTATION KEPT YOU FROM MEETINGS, WORK, OR FROM GETTING THINGS NEEDED FOR DAILY LIVING?: NO

## 2023-10-30 SDOH — ECONOMIC STABILITY: INCOME INSECURITY: HOW HARD IS IT FOR YOU TO PAY FOR THE VERY BASICS LIKE FOOD, HOUSING, MEDICAL CARE, AND HEATING?: NOT HARD AT ALL

## 2023-10-30 SDOH — ECONOMIC STABILITY: FOOD INSECURITY: WITHIN THE PAST 12 MONTHS, THE FOOD YOU BOUGHT JUST DIDN'T LAST AND YOU DIDN'T HAVE MONEY TO GET MORE.: NEVER TRUE

## 2023-10-30 SDOH — ECONOMIC STABILITY: FOOD INSECURITY: WITHIN THE PAST 12 MONTHS, YOU WORRIED THAT YOUR FOOD WOULD RUN OUT BEFORE YOU GOT MONEY TO BUY MORE.: NEVER TRUE

## 2023-10-30 SDOH — ECONOMIC STABILITY: HOUSING INSECURITY
IN THE LAST 12 MONTHS, WAS THERE A TIME WHEN YOU DID NOT HAVE A STEADY PLACE TO SLEEP OR SLEPT IN A SHELTER (INCLUDING NOW)?: NO

## 2023-10-30 ASSESSMENT — PATIENT HEALTH QUESTIONNAIRE - PHQ9
SUM OF ALL RESPONSES TO PHQ QUESTIONS 1-9: 2
1. LITTLE INTEREST OR PLEASURE IN DOING THINGS: MORE THAN HALF THE DAYS
SUM OF ALL RESPONSES TO PHQ QUESTIONS 1-9: 2
2. FEELING DOWN, DEPRESSED OR HOPELESS: 0
SUM OF ALL RESPONSES TO PHQ9 QUESTIONS 1 & 2: 2
2. FEELING DOWN, DEPRESSED OR HOPELESS: NOT AT ALL
SUM OF ALL RESPONSES TO PHQ9 QUESTIONS 1 & 2: 2
SUM OF ALL RESPONSES TO PHQ QUESTIONS 1-9: 2
1. LITTLE INTEREST OR PLEASURE IN DOING THINGS: 2
SUM OF ALL RESPONSES TO PHQ QUESTIONS 1-9: 2

## 2023-10-30 NOTE — TELEPHONE ENCOUNTER
Patient would like refills on the following medications:     Omeprazole 20 mg last filled on 7/11/22 with 90 and 3 refills. Propanolol 80 mg 7/11/22 with 90 and 3 refills. Last visit was on 7/11/22 with Isaias Quezada NP. Pt does not have any future appointments. Vm left for the patent to call the office to make an appointment.

## 2023-11-02 ENCOUNTER — OFFICE VISIT (OUTPATIENT)
Dept: FAMILY MEDICINE CLINIC | Facility: CLINIC | Age: 33
End: 2023-11-02
Payer: COMMERCIAL

## 2023-11-02 VITALS
RESPIRATION RATE: 14 BRPM | TEMPERATURE: 99 F | OXYGEN SATURATION: 95 % | DIASTOLIC BLOOD PRESSURE: 90 MMHG | HEART RATE: 89 BPM | SYSTOLIC BLOOD PRESSURE: 150 MMHG | HEIGHT: 63 IN | WEIGHT: 225 LBS | BODY MASS INDEX: 39.87 KG/M2

## 2023-11-02 DIAGNOSIS — G44.219 EPISODIC TENSION-TYPE HEADACHE, NOT INTRACTABLE: ICD-10-CM

## 2023-11-02 DIAGNOSIS — N92.1 MENORRHAGIA WITH IRREGULAR CYCLE: ICD-10-CM

## 2023-11-02 DIAGNOSIS — Z01.419 PERIODIC HEALTH ASSESSMENT, PAP AND PELVIC: Primary | ICD-10-CM

## 2023-11-02 DIAGNOSIS — N94.6 CRAMPY PAIN ASSOCIATED WITH MENSES: ICD-10-CM

## 2023-11-02 DIAGNOSIS — N92.6 IRREGULAR MENSTRUAL BLEEDING: ICD-10-CM

## 2023-11-02 PROCEDURE — 99214 OFFICE O/P EST MOD 30 MIN: CPT | Performed by: PHYSICIAN ASSISTANT

## 2023-11-02 RX ORDER — CYANOCOBALAMIN (VITAMIN B-12) 1000 MCG
TABLET ORAL
COMMUNITY
Start: 2023-10-01

## 2023-11-02 RX ORDER — NORETHINDRONE ACETATE AND ETHINYL ESTRADIOL 1MG-20(21)
1 KIT ORAL DAILY
Qty: 3 PACKET | Refills: 3 | Status: SHIPPED | OUTPATIENT
Start: 2023-11-02

## 2023-11-09 LAB
CYTOLOGIST CVX/VAG CYTO: NORMAL
CYTOLOGY CVX/VAG DOC THIN PREP: NORMAL
HPV APTIMA: NEGATIVE
HPV GENOTYPE REFLEX: NORMAL
Lab: NORMAL
Lab: NORMAL
PATH REPORT.FINAL DX SPEC: NORMAL
STAT OF ADQ CVX/VAG CYTO-IMP: NORMAL

## 2023-11-18 ENCOUNTER — HOSPITAL ENCOUNTER (OUTPATIENT)
Dept: ULTRASOUND IMAGING | Age: 33
End: 2023-11-18
Payer: COMMERCIAL

## 2023-11-18 DIAGNOSIS — N92.6 IRREGULAR MENSTRUAL BLEEDING: ICD-10-CM

## 2023-11-18 DIAGNOSIS — N94.6 CRAMPY PAIN ASSOCIATED WITH MENSES: ICD-10-CM

## 2023-11-18 DIAGNOSIS — N92.1 MENORRHAGIA WITH IRREGULAR CYCLE: ICD-10-CM

## 2023-11-18 PROCEDURE — 76856 US EXAM PELVIC COMPLETE: CPT

## 2023-11-20 DIAGNOSIS — N83.209 CYST OF OVARY, UNSPECIFIED LATERALITY: Primary | ICD-10-CM

## 2023-11-28 ASSESSMENT — PATIENT HEALTH QUESTIONNAIRE - PHQ9
SUM OF ALL RESPONSES TO PHQ9 QUESTIONS 1 & 2: 1
SUM OF ALL RESPONSES TO PHQ QUESTIONS 1-9: 1
SUM OF ALL RESPONSES TO PHQ QUESTIONS 1-9: 1
1. LITTLE INTEREST OR PLEASURE IN DOING THINGS: SEVERAL DAYS
SUM OF ALL RESPONSES TO PHQ QUESTIONS 1-9: 1
SUM OF ALL RESPONSES TO PHQ9 QUESTIONS 1 & 2: 1
2. FEELING DOWN, DEPRESSED OR HOPELESS: 0
2. FEELING DOWN, DEPRESSED OR HOPELESS: NOT AT ALL
1. LITTLE INTEREST OR PLEASURE IN DOING THINGS: 1
SUM OF ALL RESPONSES TO PHQ QUESTIONS 1-9: 1

## 2023-12-01 ENCOUNTER — OFFICE VISIT (OUTPATIENT)
Dept: FAMILY MEDICINE CLINIC | Facility: CLINIC | Age: 33
End: 2023-12-01

## 2023-12-01 VITALS
OXYGEN SATURATION: 98 % | WEIGHT: 220.6 LBS | BODY MASS INDEX: 39.09 KG/M2 | SYSTOLIC BLOOD PRESSURE: 116 MMHG | DIASTOLIC BLOOD PRESSURE: 72 MMHG | TEMPERATURE: 98.8 F | HEART RATE: 83 BPM | HEIGHT: 63 IN

## 2023-12-01 DIAGNOSIS — K21.9 GASTROESOPHAGEAL REFLUX DISEASE WITHOUT ESOPHAGITIS: ICD-10-CM

## 2023-12-01 DIAGNOSIS — N92.1 MENORRHAGIA WITH IRREGULAR CYCLE: ICD-10-CM

## 2023-12-01 DIAGNOSIS — E78.5 DYSLIPIDEMIA: ICD-10-CM

## 2023-12-01 DIAGNOSIS — N92.6 IRREGULAR MENSTRUAL BLEEDING: ICD-10-CM

## 2023-12-01 DIAGNOSIS — G44.219 EPISODIC TENSION-TYPE HEADACHE, NOT INTRACTABLE: ICD-10-CM

## 2023-12-01 DIAGNOSIS — Z00.00 WELL ADULT EXAM: Primary | ICD-10-CM

## 2023-12-01 LAB
ALBUMIN SERPL-MCNC: 3.7 G/DL (ref 3.5–5)
ALBUMIN/GLOB SERPL: 1.1 (ref 0.4–1.6)
ALP SERPL-CCNC: 85 U/L (ref 50–136)
ALT SERPL-CCNC: 27 U/L (ref 12–65)
ANION GAP SERPL CALC-SCNC: 4 MMOL/L (ref 2–11)
AST SERPL-CCNC: 15 U/L (ref 15–37)
BASOPHILS # BLD: 0.1 K/UL (ref 0–0.2)
BASOPHILS NFR BLD: 1 % (ref 0–2)
BILIRUB SERPL-MCNC: 0.3 MG/DL (ref 0.2–1.1)
BUN SERPL-MCNC: 16 MG/DL (ref 6–23)
CALCIUM SERPL-MCNC: 9.4 MG/DL (ref 8.3–10.4)
CHLORIDE SERPL-SCNC: 109 MMOL/L (ref 101–110)
CHOLEST SERPL-MCNC: 215 MG/DL
CO2 SERPL-SCNC: 27 MMOL/L (ref 21–32)
CREAT SERPL-MCNC: 1 MG/DL (ref 0.6–1)
DIFFERENTIAL METHOD BLD: ABNORMAL
EOSINOPHIL # BLD: 0.2 K/UL (ref 0–0.8)
EOSINOPHIL NFR BLD: 2 % (ref 0.5–7.8)
ERYTHROCYTE [DISTWIDTH] IN BLOOD BY AUTOMATED COUNT: 13.9 % (ref 11.9–14.6)
EST. AVERAGE GLUCOSE BLD GHB EST-MCNC: 108 MG/DL
GLOBULIN SER CALC-MCNC: 3.4 G/DL (ref 2.8–4.5)
GLUCOSE SERPL-MCNC: 83 MG/DL (ref 65–100)
HBA1C MFR BLD: 5.4 % (ref 4.8–5.6)
HCT VFR BLD AUTO: 40.2 % (ref 35.8–46.3)
HDLC SERPL-MCNC: 34 MG/DL (ref 40–60)
HDLC SERPL: 6.3
HGB BLD-MCNC: 13.4 G/DL (ref 11.7–15.4)
IMM GRANULOCYTES # BLD AUTO: 0 K/UL (ref 0–0.5)
IMM GRANULOCYTES NFR BLD AUTO: 0 % (ref 0–5)
LDLC SERPL CALC-MCNC: 126.2 MG/DL
LYMPHOCYTES # BLD: 2.2 K/UL (ref 0.5–4.6)
LYMPHOCYTES NFR BLD: 29 % (ref 13–44)
MCH RBC QN AUTO: 30.4 PG (ref 26.1–32.9)
MCHC RBC AUTO-ENTMCNC: 33.3 G/DL (ref 31.4–35)
MCV RBC AUTO: 91.2 FL (ref 82–102)
MONOCYTES # BLD: 0.8 K/UL (ref 0.1–1.3)
MONOCYTES NFR BLD: 10 % (ref 4–12)
NEUTS SEG # BLD: 4.5 K/UL (ref 1.7–8.2)
NEUTS SEG NFR BLD: 58 % (ref 43–78)
NRBC # BLD: 0 K/UL (ref 0–0.2)
PLATELET # BLD AUTO: 419 K/UL (ref 150–450)
PMV BLD AUTO: 9.2 FL (ref 9.4–12.3)
POTASSIUM SERPL-SCNC: 3.9 MMOL/L (ref 3.5–5.1)
PROT SERPL-MCNC: 7.1 G/DL (ref 6.3–8.2)
RBC # BLD AUTO: 4.41 M/UL (ref 4.05–5.2)
SODIUM SERPL-SCNC: 140 MMOL/L (ref 133–143)
TRIGL SERPL-MCNC: 274 MG/DL (ref 35–150)
VLDLC SERPL CALC-MCNC: 54.8 MG/DL (ref 6–23)
WBC # BLD AUTO: 7.8 K/UL (ref 4.3–11.1)

## 2023-12-01 RX ORDER — OMEPRAZOLE 20 MG/1
20 CAPSULE, DELAYED RELEASE ORAL DAILY
Qty: 90 CAPSULE | Refills: 3 | Status: SHIPPED | OUTPATIENT
Start: 2023-12-01

## 2023-12-01 RX ORDER — PROPRANOLOL HYDROCHLORIDE 80 MG/1
80 CAPSULE, EXTENDED RELEASE ORAL DAILY
Qty: 90 CAPSULE | Refills: 3 | Status: SHIPPED | OUTPATIENT
Start: 2023-12-01

## 2023-12-01 ASSESSMENT — ENCOUNTER SYMPTOMS
EYES NEGATIVE: 1
RESPIRATORY NEGATIVE: 1
GASTROINTESTINAL NEGATIVE: 1
ALLERGIC/IMMUNOLOGIC NEGATIVE: 1

## 2023-12-01 NOTE — PROGRESS NOTES
HPI  Kaleigh Bartlett is a 26 y.o. female who presents in the office today for wellness exam.     Patient's last menstrual period was 2023 (exact date). Last pap smear: last month  HPV cotesting: last month  Family history of breast cancer yes, grandmother and aunt  Family history of colon cancer : none    Medical problems discussed in addition to physical:   none    Current Outpatient Medications on File Prior to Visit   Medication Sig Dispense Refill    Cyanocobalamin (B-12) 1000 MCG TABS       norethindrone-ethinyl estradiol (LOESTRIN FE ) 1-20 MG-MCG per tablet Take 1 tablet by mouth daily 3 packet 3    Multiple Vitamins-Minerals (MULTIVITAMIN ADULT) CHEW Take by mouth daily      magnesium oxide (MAG-OX) 400 MG tablet Take 1 tablet by mouth daily       No current facility-administered medications on file prior to visit. Past Medical History:   Diagnosis Date    Hypertension     Obesity        Allergies   Allergen Reactions    Hydrocodone-Acetaminophen Shortness Of Breath     Also makes itchy, clammy, causes a rash, and dizziness.     Codeine Itching    Tramadol Nausea Only       Past Surgical History:   Procedure Laterality Date     SECTION  2013    HERNIA REPAIR  2021    TUBAL LIGATION N/A        Social History     Socioeconomic History    Marital status:      Spouse name: Not on file    Number of children: Not on file    Years of education: Not on file    Highest education level: Not on file   Occupational History    Not on file   Tobacco Use    Smoking status: Never    Smokeless tobacco: Never   Vaping Use    Vaping Use: Never used   Substance and Sexual Activity    Alcohol use: Yes     Comment: A drink every couple of weeks    Drug use: No    Sexual activity: Not Currently     Partners: Male   Other Topics Concern    Not on file   Social History Narrative    Not on file     Social Determinants of Health     Financial Resource Strain: Low Risk  (10/30/2023)

## 2024-02-19 ENCOUNTER — HOSPITAL ENCOUNTER (OUTPATIENT)
Dept: ULTRASOUND IMAGING | Age: 34
Discharge: HOME | End: 2024-02-22
Attending: FAMILY MEDICINE
Payer: COMMERCIAL

## 2024-02-19 DIAGNOSIS — N83.209 CYST OF OVARY, UNSPECIFIED LATERALITY: Primary | ICD-10-CM

## 2024-02-19 DIAGNOSIS — N83.209 CYST OF OVARY, UNSPECIFIED LATERALITY: ICD-10-CM

## 2024-02-19 PROCEDURE — 76856 US EXAM PELVIC COMPLETE: CPT

## 2024-02-20 ENCOUNTER — PATIENT MESSAGE (OUTPATIENT)
Dept: FAMILY MEDICINE CLINIC | Facility: CLINIC | Age: 34
End: 2024-02-20

## 2024-02-20 NOTE — TELEPHONE ENCOUNTER
From: Anita Myrick  To: Dr. Donna Joshi  Sent: 2/20/2024 9:54 AM EST  Subject: Referral process    How is the referral process going to work?   The last one never called me yet had the referral for three months. I had to call them to make an appointment. But I don't know any obgyn as I haven't needed one for 11 years.     Anita

## 2024-03-26 ENCOUNTER — TELEPHONE (OUTPATIENT)
Dept: OBGYN CLINIC | Age: 34
End: 2024-03-26

## 2024-03-26 NOTE — PROGRESS NOTES
Unknown (10/30/2023)    Housing Stability Vital Sign     Unable to Pay for Housing in the Last Year: Not on file     Number of Places Lived in the Last Year: Not on file     Unstable Housing in the Last Year: No      Past Surgical History:   Procedure Laterality Date     SECTION  2013    HERNIA REPAIR  2021    TUBAL LIGATION N/A       OB History   No obstetric history on file.          Review of Systems    OBGyn Exam       {Time Documentation:[id number]}      An electronic signature was used to authenticate this note.    --ÓSCAR TORRES MA     
  Musculoskeletal:         General: Normal range of motion.      Cervical back: Normal range of motion.   Neurological:      General: No focal deficit present.      Mental Status: She is alert and oriented to person, place, and time.   Skin:     General: Skin is warm and dry.   Psychiatric:         Mood and Affect: Mood normal.         Behavior: Behavior normal.            On this date 3/27/2024 I have spent 45 minutes reviewing previous notes, test results and face to face with the patient discussing the diagnosis and importance of compliance with the treatment plan as well as documenting on the day of the visit.      An electronic signature was used to authenticate this note.    --Staci Mast MD

## 2024-03-27 ENCOUNTER — OFFICE VISIT (OUTPATIENT)
Dept: OBGYN CLINIC | Age: 34
End: 2024-03-27
Payer: COMMERCIAL

## 2024-03-27 ENCOUNTER — PROCEDURE VISIT (OUTPATIENT)
Dept: OBGYN CLINIC | Age: 34
End: 2024-03-27
Payer: COMMERCIAL

## 2024-03-27 VITALS
WEIGHT: 222.7 LBS | SYSTOLIC BLOOD PRESSURE: 122 MMHG | DIASTOLIC BLOOD PRESSURE: 84 MMHG | HEIGHT: 63 IN | BODY MASS INDEX: 39.46 KG/M2

## 2024-03-27 DIAGNOSIS — N93.9 ABNORMAL UTERINE BLEEDING (AUB): ICD-10-CM

## 2024-03-27 DIAGNOSIS — N94.6 DYSMENORRHEA: ICD-10-CM

## 2024-03-27 DIAGNOSIS — N83.201 RIGHT OVARIAN CYST: Primary | ICD-10-CM

## 2024-03-27 DIAGNOSIS — N83.201 CYST OF RIGHT OVARY: Primary | ICD-10-CM

## 2024-03-27 DIAGNOSIS — N92.6 IRREGULAR PERIODS: ICD-10-CM

## 2024-03-27 PROBLEM — K21.9 GASTROESOPHAGEAL REFLUX DISEASE WITHOUT ESOPHAGITIS: Status: RESOLVED | Noted: 2022-07-11 | Resolved: 2024-03-27

## 2024-03-27 PROBLEM — I10 PRIMARY HYPERTENSION: Status: ACTIVE | Noted: 2024-03-27

## 2024-03-27 PROBLEM — E66.09 CLASS 2 OBESITY DUE TO EXCESS CALORIES WITHOUT SERIOUS COMORBIDITY WITH BODY MASS INDEX (BMI) OF 38.0 TO 38.9 IN ADULT: Status: RESOLVED | Noted: 2018-10-10 | Resolved: 2024-03-27

## 2024-03-27 PROBLEM — E78.5 DYSLIPIDEMIA: Status: RESOLVED | Noted: 2020-12-29 | Resolved: 2024-03-27

## 2024-03-27 PROBLEM — E66.812 CLASS 2 OBESITY DUE TO EXCESS CALORIES WITHOUT SERIOUS COMORBIDITY WITH BODY MASS INDEX (BMI) OF 38.0 TO 38.9 IN ADULT: Status: RESOLVED | Noted: 2018-10-10 | Resolved: 2024-03-27

## 2024-03-27 PROBLEM — G44.219 EPISODIC TENSION-TYPE HEADACHE, NOT INTRACTABLE: Status: RESOLVED | Noted: 2021-08-16 | Resolved: 2024-03-27

## 2024-03-27 PROCEDURE — 3079F DIAST BP 80-89 MM HG: CPT | Performed by: STUDENT IN AN ORGANIZED HEALTH CARE EDUCATION/TRAINING PROGRAM

## 2024-03-27 PROCEDURE — 76830 TRANSVAGINAL US NON-OB: CPT | Performed by: STUDENT IN AN ORGANIZED HEALTH CARE EDUCATION/TRAINING PROGRAM

## 2024-03-27 PROCEDURE — 3074F SYST BP LT 130 MM HG: CPT | Performed by: STUDENT IN AN ORGANIZED HEALTH CARE EDUCATION/TRAINING PROGRAM

## 2024-03-27 PROCEDURE — 99204 OFFICE O/P NEW MOD 45 MIN: CPT | Performed by: STUDENT IN AN ORGANIZED HEALTH CARE EDUCATION/TRAINING PROGRAM

## 2024-04-29 ENCOUNTER — NURSE ONLY (OUTPATIENT)
Dept: OBGYN CLINIC | Age: 34
End: 2024-04-29

## 2024-04-29 DIAGNOSIS — N93.9 ABNORMAL UTERINE BLEEDING (AUB): ICD-10-CM

## 2024-04-29 LAB
ESTRADIOL SERPL-MCNC: 35.2 PG/ML
FSH SERPL-ACNC: 7.7 MIU/ML
LH SERPL-ACNC: 7 MIU/ML
PROLACTIN SERPL-MCNC: 19.8 NG/ML (ref 4.8–23.3)

## 2024-04-30 LAB — TSH, 3RD GENERATION: 2.14 UIU/ML (ref 0.27–4.2)

## 2024-05-01 LAB — 17OHP SERPL-MCNC: <10 NG/DL

## 2024-05-04 LAB
TESTOST FREE SERPL-MCNC: 2.1 PG/ML (ref 0–4.2)
TESTOST SERPL-MCNC: 29 NG/DL (ref 8–60)

## 2024-06-18 ENCOUNTER — PATIENT MESSAGE (OUTPATIENT)
Dept: OBGYN CLINIC | Age: 34
End: 2024-06-18

## 2024-06-19 ENCOUNTER — OFFICE VISIT (OUTPATIENT)
Dept: OBGYN CLINIC | Age: 34
End: 2024-06-19
Payer: COMMERCIAL

## 2024-06-19 ENCOUNTER — PROCEDURE VISIT (OUTPATIENT)
Dept: OBGYN CLINIC | Age: 34
End: 2024-06-19
Payer: COMMERCIAL

## 2024-06-19 VITALS
HEIGHT: 63 IN | BODY MASS INDEX: 39.88 KG/M2 | DIASTOLIC BLOOD PRESSURE: 76 MMHG | SYSTOLIC BLOOD PRESSURE: 128 MMHG | WEIGHT: 225.1 LBS

## 2024-06-19 DIAGNOSIS — N94.6 DYSMENORRHEA: Primary | ICD-10-CM

## 2024-06-19 DIAGNOSIS — N93.9 ABNORMAL UTERINE BLEEDING (AUB): ICD-10-CM

## 2024-06-19 DIAGNOSIS — N83.201 CYST OF RIGHT OVARY: Primary | ICD-10-CM

## 2024-06-19 DIAGNOSIS — N94.6 DYSMENORRHEA: ICD-10-CM

## 2024-06-19 DIAGNOSIS — N83.201 CYST OF RIGHT OVARY: ICD-10-CM

## 2024-06-19 PROCEDURE — 3078F DIAST BP <80 MM HG: CPT | Performed by: STUDENT IN AN ORGANIZED HEALTH CARE EDUCATION/TRAINING PROGRAM

## 2024-06-19 PROCEDURE — 3074F SYST BP LT 130 MM HG: CPT | Performed by: STUDENT IN AN ORGANIZED HEALTH CARE EDUCATION/TRAINING PROGRAM

## 2024-06-19 PROCEDURE — 99214 OFFICE O/P EST MOD 30 MIN: CPT | Performed by: STUDENT IN AN ORGANIZED HEALTH CARE EDUCATION/TRAINING PROGRAM

## 2024-06-19 PROCEDURE — 76830 TRANSVAGINAL US NON-OB: CPT | Performed by: STUDENT IN AN ORGANIZED HEALTH CARE EDUCATION/TRAINING PROGRAM

## 2024-06-19 NOTE — PROGRESS NOTES
Anita Myrick (: 1990) is a 33 y.o. , Established patient, here for evaluation of the following chief complaint(s): recheck w/ US    US findings from today:   TV GYN US PREFORMED  Uterus is anteverted/retroflexed and heterogenous.  Endo=7.6 mm. No intracavitary mass visualized.  ROV  1. Hemorrhagic cyst 3.5 x 1.9 x 3.9cm  2. Prob involuting cyst 2.0 x 1.5 x 1.8cm  LOV appears WNL w/small follicle vs simple cyst 1cm  No free fluid.    HPI:  Per last visit on 3/27/24:  PCP originally ordered ultrasound back in November to figure out why she was not having periods. Patient reports she only had x 5 periods last year. Place on OCP's back November to see if it would regulate cycle however it has not. LMP: 2/3/24  History of tubal ligation on 13.   Patient complains of a sharp pain when she goes from sitting to standing. The pain is in her hip.   Pain varies from left side to right side. When she does have a period the pain is worse.   She is taking Ibuprofen 800 mg.     US findings from 3/27/24:  Uterus is retroverted and heterogenous.   Endo= 7.4 mm. No intracavitary mass visualized.   ROV slightly hemorrhagic cyst 3.1 cm   LOV appears within normal limits- CLC= 3.1 cm   No free fluid.   Uterine volume= 138.502 cm     ASSESSMENT/PLAN:  1. Dysmenorrhea  Overview:  3/27/24: sharp pains in her hips that vary from left to right side, felt when moving from sitting or laying to standing. Also reports intense dysmenorrhea, cramping with periods. The pain is somewhat alleviated by ibuprofen 800mg. Never been diagnosed with endometriosis. Sister has endometriosis. OCPs have not helped her dysmenorrhea.    24: reports dysmenorrhea is worsening, not alleviated by conservative treatment (NSAIDs, OCPs). Would like hysterectomy. Would like right ovary removed due to chronic cysts on that ovary.  Plan for robotic assisted total laparoscopic hysterectomy, bilateral salpingectomy, right oophorectomy.  PMHx:

## 2024-06-25 NOTE — PROGRESS NOTES
Enhanced Recovery After GYN Surgery: non-diabetic patients    It is highly recommended you purchase and drink Ensure Complete - one bottle twice daily for five days starting on 08/02/24. Ensure Complete is the preferred formula over other Ensure formulas. It is recommended that you continue drinking this for one month after surgery.    The night before surgery 08/07/24, drink 2 bottles of the Ensure Pre-Surgery drink.     The morning of surgery 08/08/24, drink one bottle of the Ensure Pre-Surgery drink 2 hours prior to your arrival to the hospital. Drink this over 5-10 minutes.    Drink nothing else after drinking the pre-surgical drink the morning of surgery.    Bring your patient handbook with you to the hospital.    Things to remember:    1. You will be given clear liquids to drink, advancing diet as tolerated    2. You will be up and moving around with assistance 2-4 hours after surgery.    3. You will be given regularly scheduled pain medications (NSAIDS, Tylenol) with narcotics as needed.    4. You may be able to go home that night if the surgeon okays and you are up and eating and drinking. Otherwise, your discharge will be the following morning around lunch time.     5. Continue drinking Ensure Complete for 5 days after surgery.

## 2024-07-03 ENCOUNTER — OFFICE VISIT (OUTPATIENT)
Dept: OBGYN CLINIC | Age: 34
End: 2024-07-03

## 2024-07-03 VITALS
BODY MASS INDEX: 43.91 KG/M2 | DIASTOLIC BLOOD PRESSURE: 82 MMHG | WEIGHT: 247.8 LBS | SYSTOLIC BLOOD PRESSURE: 128 MMHG | HEIGHT: 63 IN

## 2024-07-03 DIAGNOSIS — R93.89 THICKENED ENDOMETRIUM: ICD-10-CM

## 2024-07-03 DIAGNOSIS — N94.6 DYSMENORRHEA: ICD-10-CM

## 2024-07-03 DIAGNOSIS — N93.9 ABNORMAL UTERINE BLEEDING (AUB): Primary | ICD-10-CM

## 2024-07-03 DIAGNOSIS — Z32.02 PREGNANCY EXAMINATION OR TEST, NEGATIVE RESULT: ICD-10-CM

## 2024-07-03 DIAGNOSIS — Z01.812 PRE-PROCEDURAL LABORATORY EXAMINATION: ICD-10-CM

## 2024-07-03 LAB
HCG, PREGNANCY, URINE, POC: NEGATIVE
VALID INTERNAL CONTROL, POC: YES

## 2024-07-03 NOTE — PROGRESS NOTES
ENDOMETRIAL BIOPSY    Date:  1990    Name:  Anita Myrick     :  1990    Age:  33 y.o.    Reason for procedure:  thickened endometrium 7.6mm        Speculum was inserted.  Cervix visualized and cleansed with betadine.  Single-tooth tenaculum was applied to anterior lip.  Curette was inserted through OS.  Uterus sounded to 8 cm.  Tissue obtained was normal in amount.  Patient tolerated procedure well.        Staci Mast MD

## 2024-07-17 ENCOUNTER — HOSPITAL ENCOUNTER (OUTPATIENT)
Dept: SURGERY | Age: 34
Discharge: HOME | End: 2024-07-20
Payer: COMMERCIAL

## 2024-07-17 ENCOUNTER — OFFICE VISIT (OUTPATIENT)
Dept: OBGYN CLINIC | Age: 34
End: 2024-07-17

## 2024-07-17 VITALS
HEIGHT: 63 IN | DIASTOLIC BLOOD PRESSURE: 80 MMHG | WEIGHT: 227.3 LBS | SYSTOLIC BLOOD PRESSURE: 136 MMHG | BODY MASS INDEX: 40.27 KG/M2

## 2024-07-17 DIAGNOSIS — N93.9 ABNORMAL UTERINE BLEEDING (AUB): ICD-10-CM

## 2024-07-17 DIAGNOSIS — Z01.818 PRE-OP TESTING: ICD-10-CM

## 2024-07-17 DIAGNOSIS — Z01.818 PRE-OP EXAMINATION: Primary | ICD-10-CM

## 2024-07-17 DIAGNOSIS — N94.6 DYSMENORRHEA: ICD-10-CM

## 2024-07-17 LAB — HGB BLD-MCNC: 13.9 G/DL (ref 11.7–15.4)

## 2024-07-17 PROCEDURE — 36415 COLL VENOUS BLD VENIPUNCTURE: CPT

## 2024-07-17 PROCEDURE — 85018 HEMOGLOBIN: CPT

## 2024-07-17 RX ORDER — OXYCODONE HYDROCHLORIDE 5 MG/1
5 TABLET ORAL EVERY 6 HOURS PRN
Qty: 20 TABLET | Refills: 0 | Status: CANCELLED | OUTPATIENT
Start: 2024-07-17 | End: 2024-07-24

## 2024-07-17 RX ORDER — SODIUM CHLORIDE 0.9 % (FLUSH) 0.9 %
5-40 SYRINGE (ML) INJECTION EVERY 12 HOURS SCHEDULED
Status: CANCELLED | OUTPATIENT
Start: 2024-07-17

## 2024-07-17 RX ORDER — ONDANSETRON 4 MG/1
4 TABLET, FILM COATED ORAL EVERY 8 HOURS PRN
Qty: 30 TABLET | Refills: 0 | Status: SHIPPED | OUTPATIENT
Start: 2024-07-17

## 2024-07-17 RX ORDER — SODIUM CHLORIDE 9 MG/ML
INJECTION, SOLUTION INTRAVENOUS PRN
Status: CANCELLED | OUTPATIENT
Start: 2024-07-17

## 2024-07-17 RX ORDER — IBUPROFEN 800 MG/1
800 TABLET ORAL EVERY 8 HOURS PRN
Qty: 90 TABLET | Refills: 0 | Status: SHIPPED | OUTPATIENT
Start: 2024-07-17

## 2024-07-17 RX ORDER — SODIUM CHLORIDE 0.9 % (FLUSH) 0.9 %
5-40 SYRINGE (ML) INJECTION PRN
Status: CANCELLED | OUTPATIENT
Start: 2024-07-17

## 2024-07-17 NOTE — H&P (VIEW-ONLY)
Gynecology History and Physical    Name: Anita Myrick MRN: [id number] SSN: [id number]    YOB: 1990  Age: 33 y.o.  Sex: female       Subjective:      Chief complaint:  Dysmenorrhea    Anita is a 33 y.o.  female with a history of dysmenorrhea.     The current method of family planning is none.    OB History          2    Para   2    Term   2            AB        Living   2         SAB        IAB        Ectopic        Molar        Multiple        Live Births   2              Past Medical History:   Diagnosis Date    Anemia     With 2013 pregnancy    Hypertension     Obesity      Past Surgical History:   Procedure Laterality Date     SECTION  2013    HERNIA REPAIR  2021    TUBAL LIGATION N/A     UMBILICAL HERNIA REPAIR  2014    Surgically repaired      Social History     Occupational History    Not on file   Tobacco Use    Smoking status: Never    Smokeless tobacco: Never   Vaping Use    Vaping Use: Never used   Substance and Sexual Activity    Alcohol use: Yes     Comment: A drink every couple of weeks    Drug use: No    Sexual activity: Yes     Partners: Male     Birth control/protection: Surgical     Comment: Tubal ligation     Family History   Problem Relation Age of Onset    Diabetes Mother     Hypertension Mother     Thyroid Disease Mother     Celiac Disease Mother     Hypertension Sister     Hypertension Father     Stroke Father         2009    Diabetes Father     Heart Attack Father         2019    High Blood Pressure Father     High Cholesterol Father     Cancer Paternal Aunt     Heart Attack Paternal Uncle                 Allergies   Allergen Reactions    Hydrocodone-Acetaminophen Shortness Of Breath     Also makes itchy, clammy, causes a rash, and dizziness.    Codeine Itching    Tramadol Nausea Only     Prior to Admission medications    Medication Sig Start Date End Date Taking? Authorizing Provider   ibuprofen (ADVIL;MOTRIN) 800 MG  tablet Take 1 tablet by mouth every 8 hours as needed for Pain 7/17/24   Staci Mast MD   ondansetron (ZOFRAN) 4 MG tablet Take 1 tablet by mouth every 8 hours as needed for Nausea or Vomiting 7/17/24   Staci Mast MD   omeprazole (PRILOSEC) 20 MG delayed release capsule Take 1 capsule by mouth daily 12/1/23   Donna Joshi MD   propranolol (INDERAL LA) 80 MG extended release capsule Take 1 capsule by mouth daily 12/1/23   Donna Joshi MD   Cyanocobalamin (B-12) 1000 MCG TABS  10/1/23   ProviderBao MD   Multiple Vitamins-Minerals (MULTIVITAMIN ADULT) CHEW Take by mouth daily    ProviderBao MD   magnesium oxide (MAG-OX) 400 MG tablet Take 1 tablet by mouth daily    Automatic Reconciliation, Ar        Review of Systems:  A comprehensive review of systems was negative except for that written in the History of Present Illness.     Objective:     There were no vitals filed for this visit.    Physical Exam:  General:  Alert, cooperative, no distress.   Head:  Normocephalic, without obvious abnormality, atraumatic.   Eyes:  Conjunctivae/corneas clear. Extraocular movements intact.   Lungs:   Non-labored respirations.   Chest wall:  No tenderness or deformity.   Heart:  Regular rate, normal perfusion   Abdomen:   Soft, non-tender. No masses. No organomegaly.   Extremities: Extremities normal, atraumatic, no cyanosis or edema.       Skin: Skin color, texture, turgor normal. No acute rashes or lesions.   Lymph nodes: Cervical, supraclavicular, and axillary nodes normal.   Neurologic: No focal deficits. Normal strength, sensation throughout.      Assessment:     1. Dysmenorrhea  Overview:  3/27/24: sharp pains in her hips that vary from left to right side, felt when moving from sitting or laying to standing. Also reports intense dysmenorrhea, cramping with periods. The pain is somewhat alleviated by ibuprofen 800mg. Never been diagnosed with endometriosis. Sister has endometriosis. OCPs

## 2024-07-17 NOTE — PROGRESS NOTES
Preop Visit    Ms. Anita Myrick presents for a preop visit.  She is scheduled for a ERAS/ HYSTERECTOMY ABDOMINAL LAPAROSCOPIC ROBOTIC/ BILATERAL SALPINGECTOMY/ RIGHT OOPHORECTOMY  .Her history, meds, and allergies were reviewed.  The procedure was reviewed in detail as well as the risks of bleeding, infection, DVT and potential surgical complications involving the bladder, ureters, colon or intestines.  Also the alternatives,  benefits, recovery and follow-up. Prevention of SSI discussed as indicated.  All of her questions were answered.    Exam:  Lungs CTAB  Heart RRR    See the hospital H&P as well as prior chart for details.

## 2024-08-01 NOTE — PERIOP NOTE
Patient verified name and     Order for consent found in EHR and matches case posting; patient verified.     Type 2 surgery    Labs per surgeon: CBC, Pregnancy, T&S--all to be completed DOS; orders signed and held in EHR.   Labs per anesthesia protocol: Hgb 13.9 on 24; results within anesthesia guidelines   EKG: None per anesthesia protocol.     Patient attended GYN/ERAS class on 24. Labs were completed. Patient received all patient education and hospital approved surgical skin cleanser.     Patient instructed to hold all vitamins 7 days prior to surgery and NSAIDS 5 days prior to surgery, patient verbalized understanding.    Patient instructed to continue previous medications as prescribed prior to surgery and to take the following medications the day of surgery according to anesthesia guidelines with a small sip of water: Propranolol, Omeprazole.     Patient answered medical/surgical history questions at their best of ability. All prior to admission medications documented in [facility].

## 2024-08-07 ENCOUNTER — ANESTHESIA EVENT (OUTPATIENT)
Dept: SURGERY | Age: 34
End: 2024-08-07
Payer: COMMERCIAL

## 2024-08-08 ENCOUNTER — HOSPITAL ENCOUNTER (OUTPATIENT)
Age: 34
Setting detail: OUTPATIENT SURGERY
Discharge: HOME | End: 2024-08-08
Attending: STUDENT IN AN ORGANIZED HEALTH CARE EDUCATION/TRAINING PROGRAM | Admitting: STUDENT IN AN ORGANIZED HEALTH CARE EDUCATION/TRAINING PROGRAM
Payer: COMMERCIAL

## 2024-08-08 ENCOUNTER — ANESTHESIA (OUTPATIENT)
Dept: SURGERY | Age: 34
End: 2024-08-08
Payer: COMMERCIAL

## 2024-08-08 ENCOUNTER — PREP FOR PROCEDURE (OUTPATIENT)
Dept: OBGYN CLINIC | Age: 34
End: 2024-08-08

## 2024-08-08 VITALS
SYSTOLIC BLOOD PRESSURE: 136 MMHG | RESPIRATION RATE: 24 BRPM | OXYGEN SATURATION: 94 % | TEMPERATURE: 98.4 F | DIASTOLIC BLOOD PRESSURE: 87 MMHG | HEART RATE: 85 BPM | WEIGHT: 226.4 LBS | HEIGHT: 63 IN | BODY MASS INDEX: 40.11 KG/M2

## 2024-08-08 DIAGNOSIS — Z01.818 PRE-OP TESTING: Primary | ICD-10-CM

## 2024-08-08 DIAGNOSIS — N94.6 DYSMENORRHEA: Primary | ICD-10-CM

## 2024-08-08 PROBLEM — N80.9 ENDOMETRIOSIS: Status: ACTIVE | Noted: 2024-08-08

## 2024-08-08 LAB
ABO + RH BLD: NORMAL
BLOOD GROUP ANTIBODIES SERPL: NORMAL
ERYTHROCYTE [DISTWIDTH] IN BLOOD BY AUTOMATED COUNT: 13.9 % (ref 11.9–14.6)
HCG UR QL: NEGATIVE
HCT VFR BLD AUTO: 39.6 % (ref 35.8–46.3)
HGB BLD-MCNC: 13.7 G/DL (ref 11.7–15.4)
MCH RBC QN AUTO: 30.2 PG (ref 26.1–32.9)
MCHC RBC AUTO-ENTMCNC: 34.6 G/DL (ref 31.4–35)
MCV RBC AUTO: 87.4 FL (ref 82–102)
NRBC # BLD: 0 K/UL (ref 0–0.2)
PLATELET # BLD AUTO: 391 K/UL (ref 150–450)
PMV BLD AUTO: 9 FL (ref 9.4–12.3)
RBC # BLD AUTO: 4.53 M/UL (ref 4.05–5.2)
SPECIMEN EXP DATE BLD: NORMAL
WBC # BLD AUTO: 5.6 K/UL (ref 4.3–11.1)

## 2024-08-08 PROCEDURE — 86901 BLOOD TYPING SEROLOGIC RH(D): CPT

## 2024-08-08 PROCEDURE — 7100000000 HC PACU RECOVERY - FIRST 15 MIN: Performed by: STUDENT IN AN ORGANIZED HEALTH CARE EDUCATION/TRAINING PROGRAM

## 2024-08-08 PROCEDURE — 86900 BLOOD TYPING SEROLOGIC ABO: CPT

## 2024-08-08 PROCEDURE — 2500000003 HC RX 250 WO HCPCS: Performed by: NURSE ANESTHETIST, CERTIFIED REGISTERED

## 2024-08-08 PROCEDURE — C1765 ADHESION BARRIER: HCPCS | Performed by: STUDENT IN AN ORGANIZED HEALTH CARE EDUCATION/TRAINING PROGRAM

## 2024-08-08 PROCEDURE — 6370000000 HC RX 637 (ALT 250 FOR IP): Performed by: ANESTHESIOLOGY

## 2024-08-08 PROCEDURE — 6360000002 HC RX W HCPCS: Performed by: STUDENT IN AN ORGANIZED HEALTH CARE EDUCATION/TRAINING PROGRAM

## 2024-08-08 PROCEDURE — 2580000003 HC RX 258: Performed by: ANESTHESIOLOGY

## 2024-08-08 PROCEDURE — 85027 COMPLETE CBC AUTOMATED: CPT

## 2024-08-08 PROCEDURE — 2709999900 HC NON-CHARGEABLE SUPPLY: Performed by: STUDENT IN AN ORGANIZED HEALTH CARE EDUCATION/TRAINING PROGRAM

## 2024-08-08 PROCEDURE — 86850 RBC ANTIBODY SCREEN: CPT

## 2024-08-08 PROCEDURE — 7100000011 HC PHASE II RECOVERY - ADDTL 15 MIN: Performed by: STUDENT IN AN ORGANIZED HEALTH CARE EDUCATION/TRAINING PROGRAM

## 2024-08-08 PROCEDURE — 3700000001 HC ADD 15 MINUTES (ANESTHESIA): Performed by: STUDENT IN AN ORGANIZED HEALTH CARE EDUCATION/TRAINING PROGRAM

## 2024-08-08 PROCEDURE — 2720000010 HC SURG SUPPLY STERILE: Performed by: STUDENT IN AN ORGANIZED HEALTH CARE EDUCATION/TRAINING PROGRAM

## 2024-08-08 PROCEDURE — 3600000019 HC SURGERY ROBOT ADDTL 15MIN: Performed by: STUDENT IN AN ORGANIZED HEALTH CARE EDUCATION/TRAINING PROGRAM

## 2024-08-08 PROCEDURE — 2500000003 HC RX 250 WO HCPCS: Performed by: ANESTHESIOLOGY

## 2024-08-08 PROCEDURE — 3700000000 HC ANESTHESIA ATTENDED CARE: Performed by: STUDENT IN AN ORGANIZED HEALTH CARE EDUCATION/TRAINING PROGRAM

## 2024-08-08 PROCEDURE — 6360000002 HC RX W HCPCS: Performed by: NURSE ANESTHETIST, CERTIFIED REGISTERED

## 2024-08-08 PROCEDURE — 7100000010 HC PHASE II RECOVERY - FIRST 15 MIN: Performed by: STUDENT IN AN ORGANIZED HEALTH CARE EDUCATION/TRAINING PROGRAM

## 2024-08-08 PROCEDURE — 6360000002 HC RX W HCPCS: Performed by: ANESTHESIOLOGY

## 2024-08-08 PROCEDURE — 44180 LAP ENTEROLYSIS: CPT | Performed by: SURGERY

## 2024-08-08 PROCEDURE — 88307 TISSUE EXAM BY PATHOLOGIST: CPT

## 2024-08-08 PROCEDURE — 81025 URINE PREGNANCY TEST: CPT

## 2024-08-08 PROCEDURE — 7100000001 HC PACU RECOVERY - ADDTL 15 MIN: Performed by: STUDENT IN AN ORGANIZED HEALTH CARE EDUCATION/TRAINING PROGRAM

## 2024-08-08 PROCEDURE — 3600000009 HC SURGERY ROBOT BASE: Performed by: STUDENT IN AN ORGANIZED HEALTH CARE EDUCATION/TRAINING PROGRAM

## 2024-08-08 PROCEDURE — S2900 ROBOTIC SURGICAL SYSTEM: HCPCS | Performed by: STUDENT IN AN ORGANIZED HEALTH CARE EDUCATION/TRAINING PROGRAM

## 2024-08-08 RX ORDER — GLYCOPYRROLATE 0.2 MG/ML
INJECTION INTRAMUSCULAR; INTRAVENOUS PRN
Status: DISCONTINUED | OUTPATIENT
Start: 2024-08-08 | End: 2024-08-08 | Stop reason: SDUPTHER

## 2024-08-08 RX ORDER — MIDAZOLAM HYDROCHLORIDE 2 MG/2ML
2 INJECTION, SOLUTION INTRAMUSCULAR; INTRAVENOUS
Status: DISCONTINUED | OUTPATIENT
Start: 2024-08-08 | End: 2024-08-08 | Stop reason: HOSPADM

## 2024-08-08 RX ORDER — SODIUM CHLORIDE 0.9 % (FLUSH) 0.9 %
5-40 SYRINGE (ML) INJECTION EVERY 12 HOURS SCHEDULED
Status: DISCONTINUED | OUTPATIENT
Start: 2024-08-08 | End: 2024-08-08 | Stop reason: HOSPADM

## 2024-08-08 RX ORDER — SODIUM CHLORIDE 0.9 % (FLUSH) 0.9 %
5-40 SYRINGE (ML) INJECTION PRN
Status: DISCONTINUED | OUTPATIENT
Start: 2024-08-08 | End: 2024-08-08 | Stop reason: HOSPADM

## 2024-08-08 RX ORDER — EPHEDRINE SULFATE 5 MG/ML
INJECTION INTRAVENOUS PRN
Status: DISCONTINUED | OUTPATIENT
Start: 2024-08-08 | End: 2024-08-08 | Stop reason: SDUPTHER

## 2024-08-08 RX ORDER — CEFAZOLIN SODIUM 1 G/3ML
INJECTION, POWDER, FOR SOLUTION INTRAMUSCULAR; INTRAVENOUS PRN
Status: DISCONTINUED | OUTPATIENT
Start: 2024-08-08 | End: 2024-08-08 | Stop reason: SDUPTHER

## 2024-08-08 RX ORDER — ONDANSETRON 2 MG/ML
4 INJECTION INTRAMUSCULAR; INTRAVENOUS EVERY 6 HOURS PRN
Status: CANCELLED | OUTPATIENT
Start: 2024-08-08

## 2024-08-08 RX ORDER — KETAMINE HCL IN NACL, ISO-OSM 20 MG/2 ML
SYRINGE (ML) INJECTION PRN
Status: DISCONTINUED | OUTPATIENT
Start: 2024-08-08 | End: 2024-08-08 | Stop reason: SDUPTHER

## 2024-08-08 RX ORDER — PROCHLORPERAZINE EDISYLATE 5 MG/ML
5 INJECTION INTRAMUSCULAR; INTRAVENOUS
Status: DISCONTINUED | OUTPATIENT
Start: 2024-08-08 | End: 2024-08-08 | Stop reason: HOSPADM

## 2024-08-08 RX ORDER — ONDANSETRON 4 MG/1
4 TABLET, ORALLY DISINTEGRATING ORAL EVERY 8 HOURS PRN
Status: CANCELLED | OUTPATIENT
Start: 2024-08-08

## 2024-08-08 RX ORDER — KETOROLAC TROMETHAMINE 30 MG/ML
30 INJECTION, SOLUTION INTRAMUSCULAR; INTRAVENOUS EVERY 6 HOURS
Status: CANCELLED | OUTPATIENT
Start: 2024-08-08 | End: 2024-08-09

## 2024-08-08 RX ORDER — OXYCODONE HYDROCHLORIDE 5 MG/1
10 TABLET ORAL EVERY 4 HOURS PRN
Status: CANCELLED | OUTPATIENT
Start: 2024-08-08

## 2024-08-08 RX ORDER — ROCURONIUM BROMIDE 10 MG/ML
INJECTION, SOLUTION INTRAVENOUS PRN
Status: DISCONTINUED | OUTPATIENT
Start: 2024-08-08 | End: 2024-08-08 | Stop reason: SDUPTHER

## 2024-08-08 RX ORDER — KETOROLAC TROMETHAMINE 30 MG/ML
INJECTION, SOLUTION INTRAMUSCULAR; INTRAVENOUS PRN
Status: DISCONTINUED | OUTPATIENT
Start: 2024-08-08 | End: 2024-08-08 | Stop reason: SDUPTHER

## 2024-08-08 RX ORDER — NEOSTIGMINE METHYLSULFATE 1 MG/ML
INJECTION, SOLUTION INTRAVENOUS PRN
Status: DISCONTINUED | OUTPATIENT
Start: 2024-08-08 | End: 2024-08-08 | Stop reason: SDUPTHER

## 2024-08-08 RX ORDER — LIDOCAINE HYDROCHLORIDE 10 MG/ML
1 INJECTION, SOLUTION INFILTRATION; PERINEURAL
Status: COMPLETED | OUTPATIENT
Start: 2024-08-08 | End: 2024-08-08

## 2024-08-08 RX ORDER — SODIUM CHLORIDE 9 MG/ML
INJECTION, SOLUTION INTRAVENOUS PRN
Status: DISCONTINUED | OUTPATIENT
Start: 2024-08-08 | End: 2024-08-08 | Stop reason: HOSPADM

## 2024-08-08 RX ORDER — ACETAMINOPHEN 500 MG
1000 TABLET ORAL ONCE
Status: COMPLETED | OUTPATIENT
Start: 2024-08-08 | End: 2024-08-08

## 2024-08-08 RX ORDER — OXYCODONE HYDROCHLORIDE 5 MG/1
5 TABLET ORAL EVERY 4 HOURS PRN
Status: CANCELLED | OUTPATIENT
Start: 2024-08-08

## 2024-08-08 RX ORDER — FENTANYL CITRATE 50 UG/ML
INJECTION, SOLUTION INTRAMUSCULAR; INTRAVENOUS PRN
Status: DISCONTINUED | OUTPATIENT
Start: 2024-08-08 | End: 2024-08-08 | Stop reason: SDUPTHER

## 2024-08-08 RX ORDER — IBUPROFEN 800 MG/1
800 TABLET ORAL EVERY 8 HOURS
Status: CANCELLED | OUTPATIENT
Start: 2024-08-09

## 2024-08-08 RX ORDER — EPHEDRINE SULFATE/0.9% NACL/PF 50 MG/5 ML
SYRINGE (ML) INTRAVENOUS PRN
Status: DISCONTINUED | OUTPATIENT
Start: 2024-08-08 | End: 2024-08-08

## 2024-08-08 RX ORDER — LIDOCAINE HYDROCHLORIDE 20 MG/ML
INJECTION, SOLUTION EPIDURAL; INFILTRATION; INTRACAUDAL; PERINEURAL PRN
Status: DISCONTINUED | OUTPATIENT
Start: 2024-08-08 | End: 2024-08-08 | Stop reason: SDUPTHER

## 2024-08-08 RX ORDER — MIDAZOLAM HYDROCHLORIDE 1 MG/ML
INJECTION INTRAMUSCULAR; INTRAVENOUS PRN
Status: DISCONTINUED | OUTPATIENT
Start: 2024-08-08 | End: 2024-08-08 | Stop reason: SDUPTHER

## 2024-08-08 RX ORDER — ONDANSETRON 2 MG/ML
INJECTION INTRAMUSCULAR; INTRAVENOUS PRN
Status: DISCONTINUED | OUTPATIENT
Start: 2024-08-08 | End: 2024-08-08 | Stop reason: SDUPTHER

## 2024-08-08 RX ORDER — ACETAMINOPHEN 500 MG
1000 TABLET ORAL EVERY 8 HOURS SCHEDULED
Status: CANCELLED | OUTPATIENT
Start: 2024-08-08

## 2024-08-08 RX ORDER — SODIUM CHLORIDE 0.9 % (FLUSH) 0.9 %
5-40 SYRINGE (ML) INJECTION PRN
Status: CANCELLED | OUTPATIENT
Start: 2024-08-08

## 2024-08-08 RX ORDER — OXYCODONE HYDROCHLORIDE 5 MG/1
5 TABLET ORAL
Status: COMPLETED | OUTPATIENT
Start: 2024-08-08 | End: 2024-08-08

## 2024-08-08 RX ORDER — NALOXONE HYDROCHLORIDE 0.4 MG/ML
INJECTION, SOLUTION INTRAMUSCULAR; INTRAVENOUS; SUBCUTANEOUS PRN
Status: DISCONTINUED | OUTPATIENT
Start: 2024-08-08 | End: 2024-08-08 | Stop reason: HOSPADM

## 2024-08-08 RX ORDER — DEXAMETHASONE SODIUM PHOSPHATE 10 MG/ML
INJECTION INTRAMUSCULAR; INTRAVENOUS PRN
Status: DISCONTINUED | OUTPATIENT
Start: 2024-08-08 | End: 2024-08-08 | Stop reason: SDUPTHER

## 2024-08-08 RX ORDER — SODIUM CHLORIDE 0.9 % (FLUSH) 0.9 %
5-40 SYRINGE (ML) INJECTION EVERY 12 HOURS SCHEDULED
Status: CANCELLED | OUTPATIENT
Start: 2024-08-08

## 2024-08-08 RX ORDER — SCOLOPAMINE TRANSDERMAL SYSTEM 1 MG/1
1 PATCH, EXTENDED RELEASE TRANSDERMAL
Status: DISCONTINUED | OUTPATIENT
Start: 2024-08-08 | End: 2024-08-08 | Stop reason: HOSPADM

## 2024-08-08 RX ORDER — SODIUM CHLORIDE, SODIUM LACTATE, POTASSIUM CHLORIDE, CALCIUM CHLORIDE 600; 310; 30; 20 MG/100ML; MG/100ML; MG/100ML; MG/100ML
INJECTION, SOLUTION INTRAVENOUS CONTINUOUS
Status: DISCONTINUED | OUTPATIENT
Start: 2024-08-08 | End: 2024-08-08 | Stop reason: HOSPADM

## 2024-08-08 RX ORDER — PROPOFOL 10 MG/ML
INJECTION, EMULSION INTRAVENOUS PRN
Status: DISCONTINUED | OUTPATIENT
Start: 2024-08-08 | End: 2024-08-08 | Stop reason: SDUPTHER

## 2024-08-08 RX ORDER — PROPRANOLOL HYDROCHLORIDE 80 MG/1
80 CAPSULE, EXTENDED RELEASE ORAL DAILY
Status: CANCELLED | OUTPATIENT
Start: 2024-08-09

## 2024-08-08 RX ADMIN — GLYCOPYRROLATE 0.4 MG: 0.2 INJECTION INTRAMUSCULAR; INTRAVENOUS at 10:30

## 2024-08-08 RX ADMIN — Medication 10 MG: at 08:25

## 2024-08-08 RX ADMIN — MIDAZOLAM 2 MG: 1 INJECTION INTRAMUSCULAR; INTRAVENOUS at 06:58

## 2024-08-08 RX ADMIN — PROPOFOL 40 MG: 10 INJECTION, EMULSION INTRAVENOUS at 10:37

## 2024-08-08 RX ADMIN — PHENYLEPHRINE HYDROCHLORIDE 50 MCG: 0.1 INJECTION, SOLUTION INTRAVENOUS at 08:15

## 2024-08-08 RX ADMIN — FENTANYL CITRATE 100 MCG: 50 INJECTION, SOLUTION INTRAMUSCULAR; INTRAVENOUS at 07:05

## 2024-08-08 RX ADMIN — Medication 10 MG: at 07:10

## 2024-08-08 RX ADMIN — PHENYLEPHRINE HYDROCHLORIDE 50 MCG: 0.1 INJECTION, SOLUTION INTRAVENOUS at 08:14

## 2024-08-08 RX ADMIN — DEXAMETHASONE SODIUM PHOSPHATE 10 MG: 10 INJECTION INTRAMUSCULAR; INTRAVENOUS at 07:18

## 2024-08-08 RX ADMIN — EPHEDRINE SULFATE 5 MG: 5 INJECTION INTRAVENOUS at 08:14

## 2024-08-08 RX ADMIN — Medication 20 MG: at 07:06

## 2024-08-08 RX ADMIN — ROCURONIUM BROMIDE 50 MG: 10 INJECTION, SOLUTION INTRAVENOUS at 07:05

## 2024-08-08 RX ADMIN — CEFAZOLIN 2 G: 1 INJECTION, POWDER, FOR SOLUTION INTRAMUSCULAR; INTRAVENOUS at 10:16

## 2024-08-08 RX ADMIN — ROCURONIUM BROMIDE 5 MG: 10 INJECTION, SOLUTION INTRAVENOUS at 10:01

## 2024-08-08 RX ADMIN — SODIUM CHLORIDE, SODIUM LACTATE, POTASSIUM CHLORIDE, AND CALCIUM CHLORIDE: 600; 310; 30; 20 INJECTION, SOLUTION INTRAVENOUS at 06:44

## 2024-08-08 RX ADMIN — PROPOFOL 150 MG: 10 INJECTION, EMULSION INTRAVENOUS at 07:05

## 2024-08-08 RX ADMIN — ONDANSETRON 4 MG: 2 INJECTION INTRAMUSCULAR; INTRAVENOUS at 10:20

## 2024-08-08 RX ADMIN — PHENYLEPHRINE HYDROCHLORIDE 50 MCG: 0.1 INJECTION, SOLUTION INTRAVENOUS at 07:54

## 2024-08-08 RX ADMIN — Medication 3 MG: at 10:31

## 2024-08-08 RX ADMIN — ROCURONIUM BROMIDE 10 MG: 10 INJECTION, SOLUTION INTRAVENOUS at 07:46

## 2024-08-08 RX ADMIN — ROCURONIUM BROMIDE 10 MG: 10 INJECTION, SOLUTION INTRAVENOUS at 09:20

## 2024-08-08 RX ADMIN — EPHEDRINE SULFATE 12.5 MG: 5 INJECTION INTRAVENOUS at 08:01

## 2024-08-08 RX ADMIN — KETOROLAC TROMETHAMINE 30 MG: 30 INJECTION, SOLUTION INTRAMUSCULAR at 10:23

## 2024-08-08 RX ADMIN — OXYCODONE 5 MG: 5 TABLET ORAL at 13:48

## 2024-08-08 RX ADMIN — ROCURONIUM BROMIDE 10 MG: 10 INJECTION, SOLUTION INTRAVENOUS at 08:06

## 2024-08-08 RX ADMIN — ACETAMINOPHEN 1000 MG: 500 TABLET, FILM COATED ORAL at 06:10

## 2024-08-08 RX ADMIN — SODIUM CHLORIDE, SODIUM LACTATE, POTASSIUM CHLORIDE, AND CALCIUM CHLORIDE: 600; 310; 30; 20 INJECTION, SOLUTION INTRAVENOUS at 08:03

## 2024-08-08 RX ADMIN — FENTANYL CITRATE 25 MCG: 50 INJECTION, SOLUTION INTRAMUSCULAR; INTRAVENOUS at 10:27

## 2024-08-08 RX ADMIN — LIDOCAINE HYDROCHLORIDE 100 MG: 20 INJECTION, SOLUTION EPIDURAL; INFILTRATION; INTRACAUDAL; PERINEURAL at 07:05

## 2024-08-08 RX ADMIN — Medication 2000 MG: at 07:16

## 2024-08-08 RX ADMIN — ROCURONIUM BROMIDE 10 MG: 10 INJECTION, SOLUTION INTRAVENOUS at 08:25

## 2024-08-08 RX ADMIN — ROCURONIUM BROMIDE 20 MG: 10 INJECTION, SOLUTION INTRAVENOUS at 09:41

## 2024-08-08 RX ADMIN — FENTANYL CITRATE 25 MCG: 50 INJECTION, SOLUTION INTRAMUSCULAR; INTRAVENOUS at 10:46

## 2024-08-08 RX ADMIN — LIDOCAINE HYDROCHLORIDE 1 ML: 10 INJECTION, SOLUTION INFILTRATION; PERINEURAL at 06:44

## 2024-08-08 RX ADMIN — SODIUM CHLORIDE 150 MG: 9 INJECTION, SOLUTION INTRAVENOUS at 06:21

## 2024-08-08 RX ADMIN — ROCURONIUM BROMIDE 10 MG: 10 INJECTION, SOLUTION INTRAVENOUS at 08:57

## 2024-08-08 ASSESSMENT — PAIN DESCRIPTION - DESCRIPTORS
DESCRIPTORS: SORE;CRAMPING
DESCRIPTORS: CRAMPING

## 2024-08-08 ASSESSMENT — PAIN SCALES - GENERAL
PAINLEVEL_OUTOF10: 4
PAINLEVEL_OUTOF10: 4
PAINLEVEL_OUTOF10: 5

## 2024-08-08 ASSESSMENT — PAIN - FUNCTIONAL ASSESSMENT: PAIN_FUNCTIONAL_ASSESSMENT: 0-10

## 2024-08-08 ASSESSMENT — PAIN DESCRIPTION - LOCATION
LOCATION: ABDOMEN
LOCATION: ABDOMEN

## 2024-08-08 ASSESSMENT — PAIN DESCRIPTION - ORIENTATION: ORIENTATION: ANTERIOR

## 2024-08-08 ASSESSMENT — PAIN DESCRIPTION - PAIN TYPE: TYPE: SURGICAL PAIN

## 2024-08-08 NOTE — ANESTHESIA PRE PROCEDURE
Department of Anesthesiology  Preprocedure Note       Name:  nAita Myrick   Age:  33 y.o.  :  1990                                          MRN:  [id number]         Date:  2024      Surgeon: Surgeon(s):  Staci Mast MD    Procedure: Procedure(s):  ERAS/ HYSTERECTOMY ABDOMINAL LAPAROSCOPIC ROBOTIC/ BILATERAL SALPINGECTOMY/ RIGHT OOPHORECTOMY    Medications prior to admission:   Prior to Admission medications    Medication Sig Start Date End Date Taking? Authorizing Provider   ibuprofen (ADVIL;MOTRIN) 800 MG tablet Take 1 tablet by mouth every 8 hours as needed for Pain  Patient taking differently: Take 1 tablet by mouth every 8 hours as needed for Pain For p sx 24   Staci Mast MD   ondansetron (ZOFRAN) 4 MG tablet Take 1 tablet by mouth every 8 hours as needed for Nausea or Vomiting  Patient taking differently: Take 1 tablet by mouth every 8 hours as needed for Nausea or Vomiting For p sx 24   Staci Mast MD   omeprazole (PRILOSEC) 20 MG delayed release capsule Take 1 capsule by mouth daily 23   Donna Joshi MD   propranolol (INDERAL LA) 80 MG extended release capsule Take 1 capsule by mouth daily 23   Donna Joshi MD   Cyanocobalamin (B-12) 1000 MCG TABS  10/1/23   ProviderBao MD   Multiple Vitamins-Minerals (MULTIVITAMIN ADULT) CHEW Take by mouth daily    ProviderBao MD   magnesium oxide (MAG-OX) 400 MG tablet Take 1 tablet by mouth daily    Automatic Reconciliation, Ar       Current medications:    Current Facility-Administered Medications   Medication Dose Route Frequency Provider Last Rate Last Admin   • lidocaine 1 % injection 1 mL  1 mL IntraDERmal Once PRN Renetta Marshall MD       • lactated ringers IV soln infusion   IntraVENous Continuous Renetta Marshall MD       • sodium chloride flush 0.9 % injection 5-40 mL  5-40 mL IntraVENous 2 times per day Renetta Marshall MD       • sodium chloride flush 0.9 %

## 2024-08-08 NOTE — DISCHARGE INSTRUCTIONS
Instructions Following Ambulatory Surgery    Activity   As tolerated and directed by your doctor   Bathe or shower as directed by your doctor    Diet   Clear liquids until no nausea or vomiting; then light diet for the first day   Advance to regular diet on second day, unless your doctor orders otherwise   If nausea and vomiting continues, call your doctor    Pain   Take pain medication as directed by your doctor    Call your doctor if pain is NOT relieved by medication   DO NOT take aspirin or blood thinners until directed by your doctor    Follow-Up Phone Calls   Will be made nursing staff   If you have any problems, call your doctor as needed    Call your doctor if   Excessive bleeding that does not stop after holding mild pressure over the area   Temperature of 101 degrees F or above   Redness,excessive swelling or bruising, and/or green or yellow, smelly discharge from incision    After Anesthesia   For the first 24 hours: DO NOT Drive, Drink alcoholic beverages, or Make important decisions   Be aware of dizziness following anesthesia and while taking pain medication

## 2024-08-08 NOTE — ANESTHESIA POSTPROCEDURE EVALUATION
Department of Anesthesiology  Postprocedure Note    Patient: Anita Myrick  MRN: [id number]  YOB: 1990  Date of evaluation: 8/8/2024    Procedure Summary       Date: 08/08/24 [facility]    Anesthesia Start: 0653 Anesthesia Stop: 1054    Procedures:       ERAS/ HYSTERECTOMY ABDOMINAL LAPAROSCOPIC ROBOTIC/ BILATERAL SALPINGECTOMY/ RIGHT OOPHORECTOMY (Abdomen)      LAPAROSCOPIC ROBOTIC LYSIS OF ADHESIONS/ ANOSCOPY (Abdomen) Diagnosis:       Cyst of right ovary      Dysmenorrhea      (Cyst of right ovary [N83.201])      (Dysmenorrhea [N94.6])    Surgeons: Staci Mast MD Responsible Provider: Renetta Marshall MD    Anesthesia Type: general ASA Status: 2            Anesthesia Type: No value filed.    Dano Phase I: Dano Score: 9    Dano Phase II:      Anesthesia Post Evaluation    Patient location during evaluation: PACU  Patient participation: complete - patient participated  Level of consciousness: awake and alert  Airway patency: patent  Nausea & Vomiting: no nausea and no vomiting  Cardiovascular status: hemodynamically stable  Respiratory status: acceptable, nonlabored ventilation and spontaneous ventilation  Hydration status: euvolemic  Comments: /77   Pulse 69   Temp 99 °F (37.2 °C) (Temporal)   Resp 24   Ht 1.6 m (5' 3\")   Wt 102.7 kg (226 lb 6.4 oz)   SpO2 91%   BMI 40.10 kg/m²     Multimodal analgesia pain management approach  Pain management: adequate and satisfactory to patient    No notable events documented.

## 2024-08-08 NOTE — OP NOTE
Operative Note      Patient: Anita Myrick  YOB: 1990  MRN: [id number]    Date of Procedure: 8/8/2024    Pre-Op Diagnosis: Cyst of right ovary [N83.201]  Dysmenorrhea [N94.6]    Post-Op Diagnosis: Same       Procedure(s):  ERAS/ HYSTERECTOMY ABDOMINAL LAPAROSCOPIC ROBOTIC/ BILATERAL SALPINGECTOMY/ RIGHT OOPHORECTOMY  LAPAROSCOPIC ROBOTIC LYSIS OF ADHESIONS/ [id number]    Surgeon(s):  Staci Mast MD Gonzalez Hernandez, Jessica, MD    Assistant:   * No surgical staff found *    Anesthesia: General    Estimated Blood Loss (mL): Minimal    Complications: None    Specimens:   ID Type Source Tests Collected by Time Destination   A : UTERUS, CERVIX, BILATERAL FALLOPIAN TUBES, RIGHT OVARY Tissue Uterus SURGICAL PATHOLOGY Staci Mast MD 8/8/2024 1011        Implants:  * No implants in log *      Drains:   [REMOVED] Urinary Catheter 08/08/24 2 Way;Rodriges (Removed)       Findings: Called intraoperatively due to rectum being stuck to the posterior wall of the vagina. Patient with previous history of endometriosis. Rectum was carefully dissected off the posterior uterus and upper vaginal wall. We encountered a couple endometrial cysts. Anoscopy done and no leak seen in the rectum after dissection.     Detailed Description of Procedure:   Called intraoperatively by Dr. Mast due to extensive adhesions from rectum to the posterior uterus and upper vaginal wall. Using sharp and blunt dissection, I carefully dissected the rectum off the uterus and the vagina until just passed the upper vaginal area. We encountered a few endometrial cyst on the dissection and decision was made to perform an anoscopy to confirm integrity of the rectum. Hemostasis was achieved.    An anoscopy was introduced in the rectum while holding proximal pressure on the rectum. Water was placed in the pelvis. The rectum was insufflated and no air leakage was seen. At this point, I left the Or and Dr Mast continued with her planned

## 2024-08-08 NOTE — PROGRESS NOTES
Discharge Criteria:     Pt may be discharged when meets criteria:  1. Tolerate Reg diet without nausea/vomiting  2.  Urinates easily with PVR (bladder scan) of <150cc  3.  Ambulates easily  4.  Pain controlled on po meds with pain rated 4/10 or less     Please contact Dr. Staci Mast with questions. If patient needs to be admitted overnight, please have her floor nurse page me.      Staci Mast MD  [facility]

## 2024-08-08 NOTE — PERIOP NOTE
Patient refused and in and out cath for urine. Patient attempting to void once more in bathroom. Voided successfully with 107 left in bladder for PVR

## 2024-08-08 NOTE — INTERVAL H&P NOTE
Update History & Physical    The patient's History and Physical of July 17, 2024 was reviewed with the patient and I examined the patient. There was no change. The surgical site was confirmed by the patient and me.     Plan: The risks, benefits, expected outcome, and alternative to the recommended procedure have been discussed with the patient. Patient understands and wants to proceed with the procedure.     Electronically signed by Staci Mast MD on 8/8/2024 at 6:49 AM

## 2024-08-08 NOTE — OP NOTE
Operative Note      Patient: Anita Myrick  YOB: 1990  MRN: [id number]    Date of Procedure: 8/8/2024    Pre-Op Diagnosis Codes:     * Cyst of right ovary [N83.201]     * Dysmenorrhea [N94.6]    Post-Op Diagnosis: Same, endometriosis       Procedure(s):  ERAS/ HYSTERECTOMY ABDOMINAL LAPAROSCOPIC ROBOTIC/ BILATERAL SALPINGECTOMY/ RIGHT OOPHORECTOMY  LAPAROSCOPIC ROBOTIC LYSIS OF ADHESIONS/ ANOSCOPY    Robotic assisted total laparoscopic hysterectomy, bilateral salpingectomy, right oophorectomy, cystoscopy, lysis of adhesions, anoscopy    Surgeon(s):  Staci Mast MD Gonzalez Hernandez, Jessica, MD    Assistant:   * No surgical staff found *    Anesthesia: General    Estimated Blood Loss (mL): 200     Complications: None    Specimens:   ID Type Source Tests Collected by Time Destination   A : UTERUS, CERVIX, BILATERAL FALLOPIAN TUBES, RIGHT OVARY Tissue Uterus SURGICAL PATHOLOGY Staci Mast MD 8/8/2024 1011        Implants:  * No implants in log *      Drains:   [REMOVED] Urinary Catheter 08/08/24 2 Way;Rodriges (Removed)       Findings:  Infection Present At Time Of Surgery (PATOS) (choose all levels that have infection present):  No infection present  Other Findings: NEFG, normal vagina and cervix, uterine sound length = 8.5cm. Normal liver edge and gallbladder. Omental adhesions to anterior abdominal wall in left upper quadrant and near umbilicus. Dense adhesions between rectum and posterior uterus and cervix, taken down by general surgery Dr. Montgomery. Lysis of adhesions was complex and added more than 1 hour to the surgical time.  Left ovary normal, right ovary with 3cm endometrioma. Bilateral hydrosalpinx noted and evidence of prior tubal ligation.     Detailed Description of Procedure:   The patient was taken to the operating room where general anesthesia was found to be adequate. Patient was prepped and draped in normal sterile fashion. A Rodriges catheter was placed into the urethra.  A  weighted speculum was placed in the vagina. The anterior lip of the cervix was grasped with a single-tooth tenaculum. The uterus was sounded to 8.5 cm. The cervix was dilated with Fred dilators and an [email or web address] uterine manipulator was inserted in the endometrial cavity for means of manipulation. All other instruments were then removed from the vagina.      The patient was flattened so that attention could be turned to the abdomen. A 12 mm skin incision was made at Barbosa's point. The Veress needle was then inserted at a 45-degree angle.  Intraperitoneal placement was confirmed with a water-filled syringe and a drop in cO2 pressure with insufflation. Opening pressure was 2 mmHg. The abdomen was then insufflated to 3 liters of CO2 gas. Once the abdomen was insufflated, the insufflating port was inserted under direct visualization using an Optiscope. Intraperitoneal placement was confirmed again with the scope. Three more 8 mm skin incisions were made in the umbilicus, right lower quadrant and left lower quadrant, approximately 10 to 12 cm from the umbilicus on either side.     An additional 5 mm incision was made near Barbosa's point on the left in order to facilitate taking down of omental adhesions. A 5 mm port was placed through this incision under direct visualization. Adhesions were carefully dissected down with monopolar scissors to facilitate entry of the robotic ports. Three robotic ports were then placed in the umbilical, RLQ and LLQ incisions under direct visualization. Hemostasis was assured throughout. Survey of the patient's abdomen revealed the above findings.    At this point, the robot was then docked and I unscrubbed to take over at the robotic console. Bipolar marylands and monopolar scissors were used to complete the surgery. Attention was turned towards the right ovary. The ureter was traced along is path from the pelvic brim down towards the uterus and noted to be well out of the operative field.  The

## 2024-08-21 ENCOUNTER — OFFICE VISIT (OUTPATIENT)
Dept: OBGYN CLINIC | Age: 34
End: 2024-08-21

## 2024-08-21 VITALS
DIASTOLIC BLOOD PRESSURE: 80 MMHG | HEIGHT: 63 IN | SYSTOLIC BLOOD PRESSURE: 130 MMHG | BODY MASS INDEX: 39.62 KG/M2 | WEIGHT: 223.6 LBS

## 2024-08-21 DIAGNOSIS — Z98.890 POST-OPERATIVE STATE: Primary | ICD-10-CM

## 2024-08-21 PROCEDURE — 99024 POSTOP FOLLOW-UP VISIT: CPT | Performed by: STUDENT IN AN ORGANIZED HEALTH CARE EDUCATION/TRAINING PROGRAM

## 2024-08-21 NOTE — PROGRESS NOTES
Anita presents for postop visit from ERAS/ HYSTERECTOMY ABDOMINAL LAPAROSCOPIC ROBOTIC/ BILATERAL SALPINGECTOMY/ RIGHT OOPHORECTOMY - LAPAROSCOPIC ROBOTIC LYSIS OF ADHESIONS/ ANOSCOPY about 2 weeks ago.  Doing well postoperatively without any bleeding.  Fever: Yes - on and off (never longer than 12 hours) Voiding well: Yes.  Bowel movements OK: Yes.   Some pain while voiding.     Exam: A&OX3, NAD.  Abdomen:  Non tender Incisions clean, dry, intact    Discussed pathology report which was   A:  \" UTERUS, CERVIX, BILATERAL FALLOPIAN TUBES, RIGHT OVARY\":   ENDOMETRIOMA OF OVARY. BENIGN CYSTS CONSISTENT WITH HYDATID CYSTS OF MORGAGNI. ADENOMYOSIS. CHRONIC CERVICITIS WITH SQUAMOUS METAPLASIA.   SECRETORY ENDOMETRIUM     A/P.  Stable Post op condition.  Gradually increase activity. Resumption of sexual activity is not encouraged at this time.  Follow up 6 weeks for cuff check.    Staci Mast MD  [facility]

## 2024-09-07 PROBLEM — Z01.818 PRE-OP TESTING: Status: RESOLVED | Noted: 2024-08-08 | Resolved: 2024-09-07

## 2024-09-12 ENCOUNTER — OFFICE VISIT (OUTPATIENT)
Dept: OBGYN CLINIC | Age: 34
End: 2024-09-12

## 2024-09-12 VITALS
HEIGHT: 63 IN | SYSTOLIC BLOOD PRESSURE: 128 MMHG | DIASTOLIC BLOOD PRESSURE: 70 MMHG | WEIGHT: 224 LBS | BODY MASS INDEX: 39.69 KG/M2

## 2024-09-12 DIAGNOSIS — Z98.890 POST-OPERATIVE STATE: Primary | ICD-10-CM

## 2024-09-12 DIAGNOSIS — N94.6 DYSMENORRHEA: ICD-10-CM

## 2024-09-12 PROCEDURE — 99024 POSTOP FOLLOW-UP VISIT: CPT | Performed by: STUDENT IN AN ORGANIZED HEALTH CARE EDUCATION/TRAINING PROGRAM

## 2024-12-02 PROBLEM — Z90.710 HISTORY OF HYSTERECTOMY FOR INDICATION OTHER THAN CANCER: Status: ACTIVE | Noted: 2024-12-02

## 2024-12-02 PROBLEM — N93.9 ABNORMAL UTERINE BLEEDING (AUB): Status: RESOLVED | Noted: 2024-03-27 | Resolved: 2024-12-02

## 2024-12-02 PROBLEM — N94.6 DYSMENORRHEA: Status: RESOLVED | Noted: 2024-03-27 | Resolved: 2024-12-02

## 2024-12-02 PROBLEM — N83.201 CYST OF RIGHT OVARY: Status: RESOLVED | Noted: 2024-03-27 | Resolved: 2024-12-02

## 2024-12-02 PROBLEM — N80.9 ENDOMETRIOSIS: Status: RESOLVED | Noted: 2024-08-08 | Resolved: 2024-12-02

## 2024-12-02 NOTE — PROGRESS NOTES
Will send patient to lab today for routine blood work.  ***    Seen by GYN on 7/17/24 for Dysmenorrhea.  Hysterectomy planned.  8/8/24:  had HYSTERECTOMY ABDOMINAL LAPAROSCOPIC ROBOTIC/ BILATERAL SALPINGECTOMY/ RIGHT OOPHORECTOMY  LAPAROSCOPIC ROBOTIC LYSIS OF ADHESIONS/ ANOSCOPY

## 2024-12-03 ASSESSMENT — PATIENT HEALTH QUESTIONNAIRE - PHQ9
SUM OF ALL RESPONSES TO PHQ9 QUESTIONS 1 & 2: 0
2. FEELING DOWN, DEPRESSED OR HOPELESS: NOT AT ALL
SUM OF ALL RESPONSES TO PHQ9 QUESTIONS 1 & 2: 0
SUM OF ALL RESPONSES TO PHQ QUESTIONS 1-9: 0
1. LITTLE INTEREST OR PLEASURE IN DOING THINGS: NOT AT ALL
SUM OF ALL RESPONSES TO PHQ QUESTIONS 1-9: 0
1. LITTLE INTEREST OR PLEASURE IN DOING THINGS: NOT AT ALL
SUM OF ALL RESPONSES TO PHQ QUESTIONS 1-9: 0
SUM OF ALL RESPONSES TO PHQ QUESTIONS 1-9: 0
2. FEELING DOWN, DEPRESSED OR HOPELESS: NOT AT ALL

## 2024-12-04 ENCOUNTER — OFFICE VISIT (OUTPATIENT)
Dept: FAMILY MEDICINE CLINIC | Facility: CLINIC | Age: 34
End: 2024-12-04

## 2024-12-04 VITALS
RESPIRATION RATE: 16 BRPM | DIASTOLIC BLOOD PRESSURE: 88 MMHG | TEMPERATURE: 98.6 F | HEART RATE: 69 BPM | HEIGHT: 63 IN | SYSTOLIC BLOOD PRESSURE: 134 MMHG | OXYGEN SATURATION: 97 % | BODY MASS INDEX: 40.57 KG/M2 | WEIGHT: 229 LBS

## 2024-12-04 DIAGNOSIS — G44.219 EPISODIC TENSION-TYPE HEADACHE, NOT INTRACTABLE: ICD-10-CM

## 2024-12-04 DIAGNOSIS — I10 PRIMARY HYPERTENSION: ICD-10-CM

## 2024-12-04 DIAGNOSIS — Z90.710 HISTORY OF HYSTERECTOMY FOR INDICATION OTHER THAN CANCER: ICD-10-CM

## 2024-12-04 DIAGNOSIS — Z00.00 PHYSICAL EXAM, ANNUAL: ICD-10-CM

## 2024-12-04 DIAGNOSIS — Z00.00 PHYSICAL EXAM, ANNUAL: Primary | ICD-10-CM

## 2024-12-04 DIAGNOSIS — K21.9 GASTROESOPHAGEAL REFLUX DISEASE WITHOUT ESOPHAGITIS: ICD-10-CM

## 2024-12-04 LAB
ALBUMIN SERPL-MCNC: 3.9 G/DL (ref 3.5–5)
ALBUMIN/GLOB SERPL: 1.1 (ref 1–1.9)
ALP SERPL-CCNC: 94 U/L (ref 35–104)
ALT SERPL-CCNC: 23 U/L (ref 8–45)
ANION GAP SERPL CALC-SCNC: 10 MMOL/L (ref 7–16)
AST SERPL-CCNC: 20 U/L (ref 15–37)
BASOPHILS # BLD: 0.1 K/UL (ref 0–0.2)
BASOPHILS NFR BLD: 1 % (ref 0–2)
BILIRUB SERPL-MCNC: 0.3 MG/DL (ref 0–1.2)
BUN SERPL-MCNC: 17 MG/DL (ref 6–23)
CALCIUM SERPL-MCNC: 9.5 MG/DL (ref 8.8–10.2)
CHLORIDE SERPL-SCNC: 104 MMOL/L (ref 98–107)
CHOLEST SERPL-MCNC: 239 MG/DL (ref 0–200)
CO2 SERPL-SCNC: 26 MMOL/L (ref 20–29)
CREAT SERPL-MCNC: 0.9 MG/DL (ref 0.6–1.1)
DIFFERENTIAL METHOD BLD: ABNORMAL
EOSINOPHIL # BLD: 0.2 K/UL (ref 0–0.8)
EOSINOPHIL NFR BLD: 3 % (ref 0.5–7.8)
ERYTHROCYTE [DISTWIDTH] IN BLOOD BY AUTOMATED COUNT: 14 % (ref 11.9–14.6)
GLOBULIN SER CALC-MCNC: 3.5 G/DL (ref 2.3–3.5)
GLUCOSE SERPL-MCNC: 90 MG/DL (ref 70–99)
HCT VFR BLD AUTO: 43.6 % (ref 35.8–46.3)
HDLC SERPL-MCNC: 37 MG/DL (ref 40–60)
HDLC SERPL: 6.4 (ref 0–5)
HGB BLD-MCNC: 14.4 G/DL (ref 11.7–15.4)
IMM GRANULOCYTES # BLD AUTO: 0 K/UL (ref 0–0.5)
IMM GRANULOCYTES NFR BLD AUTO: 0 % (ref 0–5)
LDLC SERPL CALC-MCNC: 170 MG/DL (ref 0–100)
LYMPHOCYTES # BLD: 2.1 K/UL (ref 0.5–4.6)
LYMPHOCYTES NFR BLD: 32 % (ref 13–44)
MCH RBC QN AUTO: 30.2 PG (ref 26.1–32.9)
MCHC RBC AUTO-ENTMCNC: 33 G/DL (ref 31.4–35)
MCV RBC AUTO: 91.4 FL (ref 82–102)
MONOCYTES # BLD: 0.7 K/UL (ref 0.1–1.3)
MONOCYTES NFR BLD: 10 % (ref 4–12)
NEUTS SEG # BLD: 3.5 K/UL (ref 1.7–8.2)
NEUTS SEG NFR BLD: 54 % (ref 43–78)
NRBC # BLD: 0 K/UL (ref 0–0.2)
PLATELET # BLD AUTO: 440 K/UL (ref 150–450)
PMV BLD AUTO: 9.2 FL (ref 9.4–12.3)
POTASSIUM SERPL-SCNC: 4.8 MMOL/L (ref 3.5–5.1)
PROT SERPL-MCNC: 7.4 G/DL (ref 6.3–8.2)
RBC # BLD AUTO: 4.77 M/UL (ref 4.05–5.2)
SODIUM SERPL-SCNC: 140 MMOL/L (ref 136–145)
TRIGL SERPL-MCNC: 160 MG/DL (ref 0–150)
TSH, 3RD GENERATION: 3.4 UIU/ML (ref 0.27–4.2)
VLDLC SERPL CALC-MCNC: 32 MG/DL (ref 6–23)
WBC # BLD AUTO: 6.5 K/UL (ref 4.3–11.1)

## 2024-12-04 RX ORDER — PROPRANOLOL HYDROCHLORIDE 80 MG/1
80 CAPSULE, EXTENDED RELEASE ORAL DAILY
Qty: 30 CAPSULE | Refills: 5 | Status: SHIPPED | OUTPATIENT
Start: 2024-12-04

## 2024-12-04 SDOH — ECONOMIC STABILITY: FOOD INSECURITY: WITHIN THE PAST 12 MONTHS, YOU WORRIED THAT YOUR FOOD WOULD RUN OUT BEFORE YOU GOT MONEY TO BUY MORE.: NEVER TRUE

## 2024-12-04 SDOH — ECONOMIC STABILITY: INCOME INSECURITY: HOW HARD IS IT FOR YOU TO PAY FOR THE VERY BASICS LIKE FOOD, HOUSING, MEDICAL CARE, AND HEATING?: NOT HARD AT ALL

## 2024-12-04 SDOH — ECONOMIC STABILITY: FOOD INSECURITY: WITHIN THE PAST 12 MONTHS, THE FOOD YOU BOUGHT JUST DIDN'T LAST AND YOU DIDN'T HAVE MONEY TO GET MORE.: NEVER TRUE

## 2024-12-04 ASSESSMENT — ENCOUNTER SYMPTOMS
SINUS PRESSURE: 0
EYE DISCHARGE: 0
WHEEZING: 0
CONSTIPATION: 0
RHINORRHEA: 0
DIARRHEA: 0
SHORTNESS OF BREATH: 0
CHEST TIGHTNESS: 0
EYE PAIN: 0

## 2024-12-04 NOTE — PROGRESS NOTES
Anita Myrick (: 1990) is a 34 y.o. female  established patient, here for evaluation of the following chief complaint(s):  Chief Complaint   Patient presents with    Annual Exam     CPE      Hypertension          ASSESSMENT/PLAN:  Anita was seen today for annual exam and hypertension.    Diagnoses and all orders for this visit:    Physical exam, annual  -     Comprehensive Metabolic Panel; Future  -     Lipid Panel; Future  -     TSH; Future  -     CBC with Auto Differential; Future    Primary hypertension  -     Comprehensive Metabolic Panel; Future  -     propranolol (INDERAL LA) 80 MG extended release capsule; Take 1 capsule by mouth daily  -     Basic Metabolic Panel; Future    History of hysterectomy for indication other than cancer    Gastroesophageal reflux disease without esophagitis  -     omeprazole (PRILOSEC) 20 MG delayed release capsule; Take 1 capsule by mouth daily    Episodic tension-type headache, not intractable  -     propranolol (INDERAL LA) 80 MG extended release capsule; Take 1 capsule by mouth daily        Continues on listed meds without difficulty.        BP today is: WNL  Currently prescribed:   Hypertension Medications       Beta Blockers Non-Selective       propranolol (INDERAL LA) 80 MG extended release capsule Take 1 capsule by mouth daily   Headaches have also been well controlled w/ Propranolol.     She continues Omeprazole daily for mgt of GERD mgt.      Date of last Cervical Cancer screen (HPV or PAP): 2023   Last Menses was on/around: Hysterectomy    --no longer needing routine pelvic exams or PAP Smears.   She has her left ovary intact.      Reports good diet, regular exercise.     Will send patient to lab today for routine blood work.    24:  had HYSTERECTOMY ABDOMINAL LAPAROSCOPIC ROBOTIC/ BILATERAL SALPINGECTOMY/ RIGHT OOPHORECTOMY  LAPAROSCOPIC ROBOTIC LYSIS OF ADHESIONS/ ANOSCOPY    PHQ screening is not concerning for depression.       Anticipatory

## 2024-12-27 ENCOUNTER — HOSPITAL ENCOUNTER (OUTPATIENT)
Dept: CT IMAGING | Age: 34
Discharge: HOME | End: 2024-12-30
Payer: COMMERCIAL

## 2024-12-27 DIAGNOSIS — Z82.49 FAMILY HISTORY OF CARDIOVASCULAR DISEASE: ICD-10-CM

## 2024-12-27 DIAGNOSIS — E78.5 DYSLIPIDEMIA: ICD-10-CM

## 2024-12-27 PROCEDURE — 75571 CT HRT W/O DYE W/CA TEST: CPT

## 2025-05-28 ENCOUNTER — LAB (OUTPATIENT)
Dept: FAMILY MEDICINE CLINIC | Facility: CLINIC | Age: 35
End: 2025-05-28

## 2025-05-28 DIAGNOSIS — I10 PRIMARY HYPERTENSION: ICD-10-CM

## 2025-05-28 LAB
ANION GAP SERPL CALC-SCNC: 13 MMOL/L (ref 7–16)
BUN SERPL-MCNC: 11 MG/DL (ref 6–23)
CALCIUM SERPL-MCNC: 9.4 MG/DL (ref 8.8–10.2)
CHLORIDE SERPL-SCNC: 103 MMOL/L (ref 98–107)
CO2 SERPL-SCNC: 23 MMOL/L (ref 20–29)
CREAT SERPL-MCNC: 0.95 MG/DL (ref 0.6–1.1)
GLUCOSE SERPL-MCNC: 92 MG/DL (ref 70–99)
POTASSIUM SERPL-SCNC: 4 MMOL/L (ref 3.5–5.1)
SODIUM SERPL-SCNC: 138 MMOL/L (ref 136–145)

## 2025-05-29 ENCOUNTER — RESULTS FOLLOW-UP (OUTPATIENT)
Dept: FAMILY MEDICINE CLINIC | Facility: CLINIC | Age: 35
End: 2025-05-29

## 2025-06-01 SDOH — ECONOMIC STABILITY: FOOD INSECURITY: WITHIN THE PAST 12 MONTHS, YOU WORRIED THAT YOUR FOOD WOULD RUN OUT BEFORE YOU GOT MONEY TO BUY MORE.: NEVER TRUE

## 2025-06-01 SDOH — ECONOMIC STABILITY: TRANSPORTATION INSECURITY
IN THE PAST 12 MONTHS, HAS THE LACK OF TRANSPORTATION KEPT YOU FROM MEDICAL APPOINTMENTS OR FROM GETTING MEDICATIONS?: NO

## 2025-06-01 SDOH — ECONOMIC STABILITY: INCOME INSECURITY: IN THE LAST 12 MONTHS, WAS THERE A TIME WHEN YOU WERE NOT ABLE TO PAY THE MORTGAGE OR RENT ON TIME?: NO

## 2025-06-01 SDOH — ECONOMIC STABILITY: FOOD INSECURITY: WITHIN THE PAST 12 MONTHS, THE FOOD YOU BOUGHT JUST DIDN'T LAST AND YOU DIDN'T HAVE MONEY TO GET MORE.: NEVER TRUE

## 2025-06-01 ASSESSMENT — PATIENT HEALTH QUESTIONNAIRE - PHQ9
2. FEELING DOWN, DEPRESSED OR HOPELESS: NOT AT ALL
SUM OF ALL RESPONSES TO PHQ QUESTIONS 1-9: 0
2. FEELING DOWN, DEPRESSED OR HOPELESS: NOT AT ALL
SUM OF ALL RESPONSES TO PHQ QUESTIONS 1-9: 0
1. LITTLE INTEREST OR PLEASURE IN DOING THINGS: NOT AT ALL
SUM OF ALL RESPONSES TO PHQ QUESTIONS 1-9: 0
SUM OF ALL RESPONSES TO PHQ QUESTIONS 1-9: 0
1. LITTLE INTEREST OR PLEASURE IN DOING THINGS: NOT AT ALL
SUM OF ALL RESPONSES TO PHQ9 QUESTIONS 1 & 2: 0

## 2025-06-02 NOTE — PROGRESS NOTES
Anita Myrick (: 1990) is a 34 y.o. female, an established patient, is here for evaluation of the following chief complaint(s):  Chief Complaint   Patient presents with    Hypertension          ASSESSMENT/PLAN:    Assessment & Plan  Primary hypertension     BP today is: well controlled.   Currently prescribed:   Hypertension Medications       Beta Blockers Non-Selective       propranolol (INDERAL LA) 80 MG extended release capsule Take 1 capsule by mouth daily     Orders:    propranolol (INDERAL LA) 80 MG extended release capsule; Take 1 capsule by mouth daily    Comprehensive Metabolic Panel; Future    Albumin/Creatinine Ratio, Urine; Future    Dyslipidemia     2024: Cardiac calcium score is\" 0\"    Orders:    Comprehensive Metabolic Panel; Future    Lipid Panel; Future    Episodic tension-type headache, not intractable   Chronic, at goal (stable), continue current treatment plan    Orders:    propranolol (INDERAL LA) 80 MG extended release capsule; Take 1 capsule by mouth daily    Gastroesophageal reflux disease without esophagitis   Chronic, at goal (stable), continue current treatment plan    Orders:    omeprazole (PRILOSEC) 20 MG delayed release capsule; Take 1 capsule by mouth daily    Class 3 severe obesity due to excess calories without serious comorbidity with body mass index (BMI) of 40.0 to 44.9 in adult (HCC)    She started a compounded semaglutide product through \"[email or web address]\" on 2025.  She states that they have are currently on a 0.25 weekly dose.  She has had to reduce the dose to 0.13 weekly on her own.  She is waiting to hear back from [employer] regarding this dose adjustment.  She has been able to lose 7 pounds since starting this medication.     Medication side effects     Patient complaining of severe constipation and nausea/vomiting since starting semaglutide.  She has had to reduce the dose of semaglutide to 0.13 weekly.  She has been taking milk of magnesia x 3 days with

## 2025-06-02 NOTE — ASSESSMENT & PLAN NOTE
BP today is: well controlled.   Currently prescribed:   Hypertension Medications       Beta Blockers Non-Selective       propranolol (INDERAL LA) 80 MG extended release capsule Take 1 capsule by mouth daily     Orders:    propranolol (INDERAL LA) 80 MG extended release capsule; Take 1 capsule by mouth daily    Comprehensive Metabolic Panel; Future    Albumin/Creatinine Ratio, Urine; Future

## 2025-06-02 NOTE — ASSESSMENT & PLAN NOTE
12/30/2024: Cardiac calcium score is\" 0\"    Orders:    Comprehensive Metabolic Panel; Future    Lipid Panel; Future

## 2025-06-04 ENCOUNTER — OFFICE VISIT (OUTPATIENT)
Dept: FAMILY MEDICINE CLINIC | Facility: CLINIC | Age: 35
End: 2025-06-04
Payer: COMMERCIAL

## 2025-06-04 VITALS
OXYGEN SATURATION: 96 % | DIASTOLIC BLOOD PRESSURE: 88 MMHG | SYSTOLIC BLOOD PRESSURE: 138 MMHG | BODY MASS INDEX: 40.61 KG/M2 | HEART RATE: 95 BPM | HEIGHT: 63 IN | WEIGHT: 229.2 LBS | TEMPERATURE: 97.8 F

## 2025-06-04 DIAGNOSIS — T88.7XXA MEDICATION SIDE EFFECTS: ICD-10-CM

## 2025-06-04 DIAGNOSIS — K21.9 GASTROESOPHAGEAL REFLUX DISEASE WITHOUT ESOPHAGITIS: ICD-10-CM

## 2025-06-04 DIAGNOSIS — G44.219 EPISODIC TENSION-TYPE HEADACHE, NOT INTRACTABLE: ICD-10-CM

## 2025-06-04 DIAGNOSIS — E66.813 CLASS 3 SEVERE OBESITY DUE TO EXCESS CALORIES WITHOUT SERIOUS COMORBIDITY WITH BODY MASS INDEX (BMI) OF 40.0 TO 44.9 IN ADULT (HCC): ICD-10-CM

## 2025-06-04 DIAGNOSIS — I10 PRIMARY HYPERTENSION: Primary | ICD-10-CM

## 2025-06-04 DIAGNOSIS — E78.5 DYSLIPIDEMIA: ICD-10-CM

## 2025-06-04 PROCEDURE — 99214 OFFICE O/P EST MOD 30 MIN: CPT | Performed by: PHYSICIAN ASSISTANT

## 2025-06-04 PROCEDURE — 3079F DIAST BP 80-89 MM HG: CPT | Performed by: PHYSICIAN ASSISTANT

## 2025-06-04 PROCEDURE — 3075F SYST BP GE 130 - 139MM HG: CPT | Performed by: PHYSICIAN ASSISTANT

## 2025-06-04 RX ORDER — OMEPRAZOLE 20 MG/1
20 CAPSULE, DELAYED RELEASE ORAL DAILY
Qty: 30 CAPSULE | Refills: 5 | Status: SHIPPED | OUTPATIENT
Start: 2025-06-04

## 2025-06-04 RX ORDER — PROPRANOLOL HYDROCHLORIDE 80 MG/1
80 CAPSULE, EXTENDED RELEASE ORAL DAILY
Qty: 30 CAPSULE | Refills: 5 | Status: SHIPPED | OUTPATIENT
Start: 2025-06-04

## 2025-06-04 RX ORDER — ONDANSETRON 4 MG/1
4 TABLET, ORALLY DISINTEGRATING ORAL 3 TIMES DAILY PRN
Qty: 30 TABLET | Refills: 2 | Status: SHIPPED | OUTPATIENT
Start: 2025-06-04

## 2025-06-04 SDOH — ECONOMIC STABILITY: FOOD INSECURITY: WITHIN THE PAST 12 MONTHS, THE FOOD YOU BOUGHT JUST DIDN'T LAST AND YOU DIDN'T HAVE MONEY TO GET MORE.: NEVER TRUE

## 2025-06-04 SDOH — ECONOMIC STABILITY: FOOD INSECURITY: WITHIN THE PAST 12 MONTHS, YOU WORRIED THAT YOUR FOOD WOULD RUN OUT BEFORE YOU GOT MONEY TO BUY MORE.: NEVER TRUE

## (undated) DEVICE — SOLUTION IV 1000ML 0.9% SOD CHL

## (undated) DEVICE — CANISTER, RIGID, 2000CC: Brand: MEDLINE INDUSTRIES, INC.

## (undated) DEVICE — TIP COVER ACCESSORY

## (undated) DEVICE — BARRIER ADH W3XL4IN UTER PELV ABSRB GYNECARE INTCEED

## (undated) DEVICE — BLADELESS OBTURATOR: Brand: WECK VISTA

## (undated) DEVICE — VISUALIZATION SYSTEM: Brand: CLEARIFY

## (undated) DEVICE — COLUMN DRAPE

## (undated) DEVICE — LOGICUT SCISSOR LENGTH 320MM: Brand: LOGI - LAPAROSCOPIC INSTRUMENT SYSTEM

## (undated) DEVICE — ARM DRAPE

## (undated) DEVICE — SOLUTION IRRIG 1000ML STRL H2O USP PLAS POUR BTL

## (undated) DEVICE — SEAL

## (undated) DEVICE — SUTURE VCRL SZ 3-0 L27IN ABSRB UD L26MM SH 1/2 CIR J416H

## (undated) DEVICE — SUTURE DEV SZ 2-0 WND CLSR ABSRB GS-22 VLOC COVIDIEN VLOCM2145

## (undated) DEVICE — ROBOTIC HYSTERECTOMY: Brand: MEDLINE INDUSTRIES, INC.

## (undated) DEVICE — SUTURE MONOCRYL SZ 4-0 L27IN ABSRB UD L19MM PS-2 1/2 CIR PRIM Y426H

## (undated) DEVICE — SCISSORS SURG DIA8MM MPLR CRV ENDOWRIST

## (undated) DEVICE — SUTURE MCRYL SZ 4-0 L27IN ABSRB UD L19MM PS-2 1/2 CIR PRIM Y426H

## (undated) DEVICE — SYRINGE MED 10ML LUERLOCK TIP W/O SFTY DISP

## (undated) DEVICE — SUCTION IRRIGATOR: Brand: ENDOWRIST

## (undated) DEVICE — TIP IU L8CM DIA6.7MM BLU SIL FLX DISP RUMI II

## (undated) DEVICE — PAD PT POS 36 IN SURGYPAD DISP

## (undated) DEVICE — TROCAR: Brand: KII FIOS FIRST ENTRY

## (undated) DEVICE — GLOVE SURG SZ 65 L12IN FNGR THK79MIL GRN LTX FREE

## (undated) DEVICE — AGENT HEMSTAT 3GM OXIDIZED REGENERATED CELOS ABSRB FOR CONT (ORDER MULTIPLES OF 5EA)

## (undated) DEVICE — GENERAL LAPAROSCOPY: Brand: MEDLINE INDUSTRIES, INC.

## (undated) DEVICE — CANNULA SEAL

## (undated) DEVICE — SHEET,DRAPE,53X77,STERILE: Brand: MEDLINE

## (undated) DEVICE — ROBOTIC DRAPE WITH LEGGINGS: Brand: CONVERTORS

## (undated) DEVICE — GARMENT,MEDLINE,DVT,INT,CALF,MED, GEN2: Brand: MEDLINE

## (undated) DEVICE — COVER,LIGHT HANDLE,FLX,2/PK: Brand: MEDLINE INDUSTRIES, INC.

## (undated) DEVICE — GLOVE SURG SZ 65 THK91MIL LTX FREE SYN POLYISOPRENE

## (undated) DEVICE — SPONGE: SPECIALTY TONSIL XR MED 100/CS: Brand: MEDICAL ACTION INDUSTRIES

## (undated) DEVICE — NEEDLE HYPO 21GA L1.5IN INTRAMUSCULAR S STL LATCH BVL UP

## (undated) DEVICE — APPLICATOR MEDICATED 26 CC SOLUTION HI LT ORNG CHLORAPREP

## (undated) DEVICE — BIPOLAR CAUTERY CORD

## (undated) DEVICE — 40585 XL ADVANCED TRENDELENBURG POSITIONING KIT: Brand: 40585 XL ADVANCED TRENDELENBURG POSITIONING KIT

## (undated) DEVICE — (D)PREP SKN CHLRAPRP APPL 26ML -- CONVERT TO ITEM 371833

## (undated) DEVICE — SUTURE ABSORBABLE MONOFILAMENT 0 CT-1 36 MM DYED SPRL PDS + SXPP1B450

## (undated) DEVICE — GOWN,REINFORCED,POLY,AURORA,XXLARGE,STR: Brand: MEDLINE

## (undated) DEVICE — SCOPE RECT LED W INSUFFLATOR

## (undated) DEVICE — ELECTRO LUBE IS A SINGLE PATIENT USE DEVICE THAT IS INTENDED TO BE USED ON ELECTROSURGICAL ELECTRODES TO REDUCE STICKING.: Brand: KEY SURGICAL ELECTRO LUBE

## (undated) DEVICE — STRIP,CLOSURE,WOUND,MEDI-STRIP,1/2X4: Brand: MEDLINE

## (undated) DEVICE — APPLIER CLP M/L SHFT DIA5MM 15 LIG LIGAMAX 5

## (undated) DEVICE — SMART SLEEVE SURGICAL GOWN, XL, POLYREINFORCED FRONT: Brand: CONVERTORS

## (undated) DEVICE — INSUFFLATION NEEDLE: Brand: SURGINEEDLE

## (undated) DEVICE — 3M™ TEGADERM™ TRANSPARENT FILM DRESSING FRAME STYLE, 1624W, 2-3/8 IN X 2-3/4 IN (6 CM X 7 CM), 100/CT 4CT/CASE: Brand: 3M™ TEGADERM™

## (undated) DEVICE — SUTURE STRATAFIX SZ 0 L12IN ABSRB VIO CT-1 L36MM SLV TAPR PT SXPP1A433

## (undated) DEVICE — CARDINAL HEALTH FLEXIBLE LIGHT HANDLE COVER: Brand: CARDINAL HEALTH

## (undated) DEVICE — PORT HND ACC L120MM 12MM BLDELSS OPT TIP AIRSEAL

## (undated) DEVICE — REM POLYHESIVE ADULT PATIENT RETURN ELECTRODE: Brand: VALLEYLAB

## (undated) DEVICE — SURGICEL ENDOSCP APPL

## (undated) DEVICE — AMD ANTIMICROBIAL GAUZE SPONGES,12 PLY USP TYPE VII, 0.2% POLYHEXAMETHYLENE BIGUANIDE HCI (PHMB): Brand: CURITY

## (undated) DEVICE — MASTISOL ADHESIVE LIQ 2/3ML

## (undated) DEVICE — 1LYRTR 16FR10ML 100%SILI SNAP: Brand: MEDLINE INDUSTRIES, INC.

## (undated) DEVICE — 3M™ TEGADERM™ TRANSPARENT FILM DRESSING FRAME STYLE, 1626W, 4 IN X 4-3/4 IN (10 CM X 12 CM), 50/CT 4CT/CASE: Brand: 3M™ TEGADERM™

## (undated) DEVICE — AIRSEAL BIFURCATED SMOKE EVAC FILTERED TUBE SET: Brand: AIRSEAL

## (undated) DEVICE — INSUFFLATION NEEDLE TO ESTABLISH PNEUMOPERITONEUM.: Brand: INSUFFLATION NEEDLE

## (undated) DEVICE — SOLUTION IV 1000ML LAC RINGERS PH 6.5 INJ USP VIAFLX PLAS

## (undated) DEVICE — TUBING INSUFFLATION SMK EVAC HI FLO SET PNEUMOCLEAR

## (undated) DEVICE — DRAPE,TOP,102X53,STERILE: Brand: MEDLINE

## (undated) DEVICE — MEGA SUTURECUT ND: Brand: ENDOWRIST

## (undated) DEVICE — APPLICATOR COT-TIP WOOD 6IN -- NON STRL